# Patient Record
Sex: FEMALE | Race: WHITE | NOT HISPANIC OR LATINO | Employment: UNEMPLOYED | URBAN - METROPOLITAN AREA
[De-identification: names, ages, dates, MRNs, and addresses within clinical notes are randomized per-mention and may not be internally consistent; named-entity substitution may affect disease eponyms.]

---

## 2024-03-26 ENCOUNTER — HOSPITAL ENCOUNTER (OUTPATIENT)
Dept: OTHER | Facility: HOSPITAL | Age: 54
Discharge: HOME | End: 2024-03-26

## 2024-06-12 ENCOUNTER — OFFICE VISIT (OUTPATIENT)
Dept: PULMONOLOGY | Facility: CLINIC | Age: 54
End: 2024-06-12
Payer: COMMERCIAL

## 2024-06-12 VITALS
WEIGHT: 149 LBS | TEMPERATURE: 97.6 F | SYSTOLIC BLOOD PRESSURE: 110 MMHG | HEART RATE: 86 BPM | DIASTOLIC BLOOD PRESSURE: 64 MMHG | BODY MASS INDEX: 29.25 KG/M2 | OXYGEN SATURATION: 97 % | HEIGHT: 60 IN | RESPIRATION RATE: 18 BRPM

## 2024-06-12 DIAGNOSIS — R63.4 WEIGHT LOSS: ICD-10-CM

## 2024-06-12 DIAGNOSIS — Z72.0 TOBACCO USE: Primary | ICD-10-CM

## 2024-06-12 DIAGNOSIS — R04.2 HEMOPTYSIS: ICD-10-CM

## 2024-06-12 DIAGNOSIS — R91.1 LUNG NODULE: ICD-10-CM

## 2024-06-12 PROCEDURE — 99204 OFFICE O/P NEW MOD 45 MIN: CPT | Performed by: STUDENT IN AN ORGANIZED HEALTH CARE EDUCATION/TRAINING PROGRAM

## 2024-06-12 PROCEDURE — 1159F MED LIST DOCD IN RCRD: CPT | Performed by: STUDENT IN AN ORGANIZED HEALTH CARE EDUCATION/TRAINING PROGRAM

## 2024-06-12 PROCEDURE — 1160F RVW MEDS BY RX/DR IN RCRD: CPT | Performed by: STUDENT IN AN ORGANIZED HEALTH CARE EDUCATION/TRAINING PROGRAM

## 2024-06-12 RX ORDER — ESOMEPRAZOLE MAGNESIUM 40 MG/1
CAPSULE, DELAYED RELEASE ORAL
COMMUNITY
Start: 2024-05-18

## 2024-06-12 RX ORDER — ALBUTEROL SULFATE 90 UG/1
2 INHALANT RESPIRATORY (INHALATION)
COMMUNITY
Start: 2024-02-21

## 2024-06-12 RX ORDER — CILOSTAZOL 50 MG/1
TABLET ORAL
COMMUNITY
Start: 2024-03-21 | End: 2024-06-14

## 2024-06-12 RX ORDER — LINACLOTIDE 72 UG/1
72 CAPSULE, GELATIN COATED ORAL DAILY
COMMUNITY
Start: 2024-02-21

## 2024-06-12 RX ORDER — ESTRADIOL 0.1 MG/G
1 CREAM VAGINAL
COMMUNITY
Start: 2024-03-22 | End: 2024-06-14

## 2024-06-12 RX ORDER — NICOTINE 21-14-7MG
KIT TRANSDERMAL
COMMUNITY
Start: 2024-04-02 | End: 2024-06-14

## 2024-06-12 RX ORDER — SEMAGLUTIDE 0.68 MG/ML
0.5 INJECTION, SOLUTION SUBCUTANEOUS
COMMUNITY
Start: 2024-04-02

## 2024-06-12 RX ORDER — ASPIRIN 81 MG/1
81 TABLET ORAL DAILY
COMMUNITY
Start: 2024-02-21

## 2024-06-12 NOTE — H&P (VIEW-ONLY)
Primary Care Provider  Crista Payton APRN   Referring Provider  FLACO Berkowitz      Patient Complaint  Establish Care (1 CM RUL)      Subjective          Alyssa Hardy presents to [facility] PULMONARY & CRITICAL CARE MEDICINE      History of Presenting Illness  Alyssa Hardy is a 53 y.o. female here as a new patient for lung nodule.    Patient had a chest CT done 3/26/2024 which showed a 1 cm spiculated noncalcified nodule in the right upper lobe.    Patient has been a chronic smoker since age 12.  She is currently smoking 0.5 pack/day.  She reports family history of lung cancer, and her father.  She also has a chronic cough with sputum production.  Sometimes there is blood in it.  She also verbalized on weight loss up to 15 pounds in the last 1 to 2 months.  She denies night sweats and lymphadenopathy.  We discussed her CT findings and she expressed understanding.  No other acute issues at this time. I have personally reviewed patient's past family, social, medical and surgical histories and agree with their findings.      Review of Systems  Constitutional:  No fever. No chills. No weakness.  Eyes: No pain, erythema, or discharge. No blurring of vision.  ENT:  No sore throat, URI symptoms.   Cardiovascular:  No chest pain. No palpitations. No lower extremity edema.  Respiratory:  No shortness of breath, cough, pleuritic chest pain. No hemoptysis. No dyspnea.   GI:  Normal appetite. No nausea, vomiting, diarrhea. No pain. No melena.  Musculoskeletal:  No arthralgias or myalgias.  Neurologic:  No headache. No weakness.    History reviewed. No pertinent family history.     Social History     Socioeconomic History    Marital status: Unknown   Tobacco Use    Smoking status: Every Day     Current packs/day: 0.50     Types: Cigarettes   Vaping Use    Vaping status: Never Used   Substance and Sexual Activity    Alcohol use: Never    Drug use: Never    Sexual activity: Defer        History reviewed.  "No pertinent past medical history.     Immunization History   Administered Date(s) Administered    COVID-19 (PFIZER) Purple Cap Monovalent 10/19/2021, 11/09/2021       Allergies   Allergen Reactions    Hydrocodone-Acetaminophen Palpitations    Latex Swelling    Other Swelling     PLASTIC ALL TYPES OF PLASTIC    Cilostazol Other (See Comments)     tachycardia    Ibuprofen Other (See Comments)     Liver problems    Iodinated Contrast Media Unknown - Low Severity    Sulfamethoxazole-Trimethoprim Other (See Comments)     Dizziness, shortness of breath, chest pain    Codeine Palpitations    Penicillins Palpitations          Current Outpatient Medications:     aspirin 81 MG EC tablet, Take 1 tablet by mouth Daily., Disp: , Rfl:     cilostazol (PLETAL) 50 MG tablet, , Disp: , Rfl:     esomeprazole (nexIUM) 40 MG capsule, , Disp: , Rfl:     estradiol (ESTRACE) 0.1 MG/GM vaginal cream, Insert 1 g into the vagina., Disp: , Rfl:     Linzess 72 MCG capsule capsule, Take 1 capsule by mouth Daily., Disp: , Rfl:     Nicotine 21-14-7 MG/24HR kit, Take per dose pack instructions., Disp: , Rfl:     Ozempic, 0.25 or 0.5 MG/DOSE, 2 MG/3ML solution pen-injector, Inject 0.5 mg under the skin into the appropriate area as directed., Disp: , Rfl:     ProAir Digihaler 108 (90 Base) MCG/ACT aerosol powder , Inhale 2 puffs., Disp: , Rfl:      Objective     Vital Signs:   /64 (BP Location: Right arm, Patient Position: Sitting, Cuff Size: Adult)   Pulse 86   Temp 97.6 °F (36.4 °C) (Temporal)   Resp 18   Ht 152.4 cm (60\")   Wt 67.6 kg (149 lb) Comment: patient reported  SpO2 97% Comment: room air  BMI 29.10 kg/m²     Physical Exam  Vitals:    06/12/24 1322   BP: 110/64   Pulse: 86   Resp: 18   Temp: 97.6 °F (36.4 °C)   SpO2: 97%       General: Alert, NAD  HEENT:  EOMI, no sinus tenderness  Neck:  Supple, no thyromegaly,  no JVD  Lymph: no cervical, supraclavicular lymphadenopathy bilaterally  Chest:  clear to auscultation " bilaterally, no wheezing or crackles; no work of breathing noted  CV: RRR, no M/G/R, pulses 2+, equal.  Abd:  Soft, NT, ND, +BS  EXT:  no clubbing, no cyanosis, no edema b/l  Neuro:  A&Ox3, CN grossly intact, no focal deficits  Skin: No rashes or lesions noted       Result Review :   I have personally reviewed patient's labs and images.          Assessment and Plan      There is no problem list on file for this patient.      Impression and Plan:    Right upper lobe 1 cm lung nodule  Active smoker, 0.5 pack/day    CT done 3/26/2024 which showed a 1 cm spiculated noncalcified nodule in the right upper lobe.  Patient is at high risk given her family history of lung cancer, hemoptysis, active weight loss, active tobacco use  Will plan for Ion navigational bronchoscopy.  Date TBD.  She will need repeat CT in Ion format.  She is not on any blood thinners at this time.    Vaccination status: Patient is up-to-date with her COVID vaccination.    Medications personally reviewed.    Follow Up   No follow-ups on file.  Patient was given instructions and counseling regarding her condition or for health maintenance advice. Please see specific information pulled into the AVS if appropriate.

## 2024-06-12 NOTE — PROGRESS NOTES
Primary Care Provider  Crista Payton APRN   Referring Provider  FLACO Berkowitz      Patient Complaint  Establish Care (1 CM RUL)      Subjective          Alyssa Hardy presents to [facility] PULMONARY & CRITICAL CARE MEDICINE      History of Presenting Illness  Alyssa Hardy is a 53 y.o. female here as a new patient for lung nodule.    Patient had a chest CT done 3/26/2024 which showed a 1 cm spiculated noncalcified nodule in the right upper lobe.    Patient has been a chronic smoker since age 12.  She is currently smoking 0.5 pack/day.  She reports family history of lung cancer, and her father.  She also has a chronic cough with sputum production.  Sometimes there is blood in it.  She also verbalized on weight loss up to 15 pounds in the last 1 to 2 months.  She denies night sweats and lymphadenopathy.  We discussed her CT findings and she expressed understanding.  No other acute issues at this time. I have personally reviewed patient's past family, social, medical and surgical histories and agree with their findings.      Review of Systems  Constitutional:  No fever. No chills. No weakness.  Eyes: No pain, erythema, or discharge. No blurring of vision.  ENT:  No sore throat, URI symptoms.   Cardiovascular:  No chest pain. No palpitations. No lower extremity edema.  Respiratory:  No shortness of breath, cough, pleuritic chest pain. No hemoptysis. No dyspnea.   GI:  Normal appetite. No nausea, vomiting, diarrhea. No pain. No melena.  Musculoskeletal:  No arthralgias or myalgias.  Neurologic:  No headache. No weakness.    History reviewed. No pertinent family history.     Social History     Socioeconomic History    Marital status: Unknown   Tobacco Use    Smoking status: Every Day     Current packs/day: 0.50     Types: Cigarettes   Vaping Use    Vaping status: Never Used   Substance and Sexual Activity    Alcohol use: Never    Drug use: Never    Sexual activity: Defer        History reviewed.  "No pertinent past medical history.     Immunization History   Administered Date(s) Administered    COVID-19 (PFIZER) Purple Cap Monovalent 10/19/2021, 11/09/2021       Allergies   Allergen Reactions    Hydrocodone-Acetaminophen Palpitations    Latex Swelling    Other Swelling     PLASTIC ALL TYPES OF PLASTIC    Cilostazol Other (See Comments)     tachycardia    Ibuprofen Other (See Comments)     Liver problems    Iodinated Contrast Media Unknown - Low Severity    Sulfamethoxazole-Trimethoprim Other (See Comments)     Dizziness, shortness of breath, chest pain    Codeine Palpitations    Penicillins Palpitations          Current Outpatient Medications:     aspirin 81 MG EC tablet, Take 1 tablet by mouth Daily., Disp: , Rfl:     cilostazol (PLETAL) 50 MG tablet, , Disp: , Rfl:     esomeprazole (nexIUM) 40 MG capsule, , Disp: , Rfl:     estradiol (ESTRACE) 0.1 MG/GM vaginal cream, Insert 1 g into the vagina., Disp: , Rfl:     Linzess 72 MCG capsule capsule, Take 1 capsule by mouth Daily., Disp: , Rfl:     Nicotine 21-14-7 MG/24HR kit, Take per dose pack instructions., Disp: , Rfl:     Ozempic, 0.25 or 0.5 MG/DOSE, 2 MG/3ML solution pen-injector, Inject 0.5 mg under the skin into the appropriate area as directed., Disp: , Rfl:     ProAir Digihaler 108 (90 Base) MCG/ACT aerosol powder , Inhale 2 puffs., Disp: , Rfl:      Objective     Vital Signs:   /64 (BP Location: Right arm, Patient Position: Sitting, Cuff Size: Adult)   Pulse 86   Temp 97.6 °F (36.4 °C) (Temporal)   Resp 18   Ht 152.4 cm (60\")   Wt 67.6 kg (149 lb) Comment: patient reported  SpO2 97% Comment: room air  BMI 29.10 kg/m²     Physical Exam  Vitals:    06/12/24 1322   BP: 110/64   Pulse: 86   Resp: 18   Temp: 97.6 °F (36.4 °C)   SpO2: 97%       General: Alert, NAD  HEENT:  EOMI, no sinus tenderness  Neck:  Supple, no thyromegaly,  no JVD  Lymph: no cervical, supraclavicular lymphadenopathy bilaterally  Chest:  clear to auscultation " bilaterally, no wheezing or crackles; no work of breathing noted  CV: RRR, no M/G/R, pulses 2+, equal.  Abd:  Soft, NT, ND, +BS  EXT:  no clubbing, no cyanosis, no edema b/l  Neuro:  A&Ox3, CN grossly intact, no focal deficits  Skin: No rashes or lesions noted       Result Review :   I have personally reviewed patient's labs and images.          Assessment and Plan      There is no problem list on file for this patient.      Impression and Plan:    Right upper lobe 1 cm lung nodule  Active smoker, 0.5 pack/day    CT done 3/26/2024 which showed a 1 cm spiculated noncalcified nodule in the right upper lobe.  Patient is at high risk given her family history of lung cancer, hemoptysis, active weight loss, active tobacco use  Will plan for Ion navigational bronchoscopy.  Date TBD.  She will need repeat CT in Ion format.  She is not on any blood thinners at this time.    Vaccination status: Patient is up-to-date with her COVID vaccination.    Medications personally reviewed.    Follow Up   No follow-ups on file.  Patient was given instructions and counseling regarding her condition or for health maintenance advice. Please see specific information pulled into the AVS if appropriate.

## 2024-06-14 ENCOUNTER — TELEPHONE (OUTPATIENT)
Dept: PULMONOLOGY | Facility: CLINIC | Age: 54
End: 2024-06-14
Payer: COMMERCIAL

## 2024-06-14 RX ORDER — ERGOCALCIFEROL 1.25 MG/1
50000 CAPSULE ORAL WEEKLY
COMMUNITY

## 2024-06-14 RX ORDER — NADOLOL 20 MG/1
20 TABLET ORAL DAILY
COMMUNITY

## 2024-06-14 NOTE — TELEPHONE ENCOUNTER
Received communication from DEE Harp with PAT.  Patient advised RN she does not wish for the person providing her transportation to be made aware of her bronchoscopy outcome or results.

## 2024-06-14 NOTE — PRE-PROCEDURE INSTRUCTIONS
IMPORTANT INSTRUCTIONS - PRE-ADMISSION TESTING  DO NOT EAT/DRINK  OR CHEW anything after midnight the night before your procedure.      Take the following medications the morning of your procedure with JUST A SIP OF WATER:  NADOLOL, NEXIUM _______________________________________________________________________________________________________________________________________________________________________________________    DO NOT BRING your medications to the hospital with you, UNLESS something has changed since your PRE-Admission Testing appointment.  Hold all vitamins, supplements, and NSAIDS (Non- steroidal anti-inflammatory meds) for one week prior to surgery (you MAY take Tylenol or Acetaminophen).  If you are diabetic, check your blood sugar the morning of your procedure. If it is less than 70 or if you are feeling symptomatic, call the following number for further instructions: [phone]  Use your inhalers/nebulizers as usual, the morning of your procedure. BRING YOUR INHALERS with you.   Bring your CPAP or BIPAP to hospital, ONLY IF YOU WILL BE SPENDING THE NIGHT.   Make sure you have a ride home and have someone who will stay with you the day of your procedure after you go home.  If you have any questions, please call your Pre-Admission Testing Nurse, CAIO_____ at [phone]____.   Per anesthesia request, do not smoke for 24 hours before your procedure or as instructed by your surgeon.

## 2024-06-17 ENCOUNTER — ANESTHESIA EVENT (OUTPATIENT)
Dept: PERIOP | Facility: HOSPITAL | Age: 54
End: 2024-06-17
Payer: COMMERCIAL

## 2024-06-18 ENCOUNTER — HOSPITAL ENCOUNTER (OUTPATIENT)
Dept: CT IMAGING | Facility: HOSPITAL | Age: 54
Discharge: HOME | End: 2024-06-18
Admitting: STUDENT IN AN ORGANIZED HEALTH CARE EDUCATION/TRAINING PROGRAM
Payer: COMMERCIAL

## 2024-06-18 DIAGNOSIS — Z72.0 TOBACCO USE: ICD-10-CM

## 2024-06-18 DIAGNOSIS — R91.1 LUNG NODULE: ICD-10-CM

## 2024-06-18 PROCEDURE — 71250 CT THORAX DX C-: CPT

## 2024-06-19 ENCOUNTER — ANESTHESIA (OUTPATIENT)
Dept: PERIOP | Facility: HOSPITAL | Age: 54
End: 2024-06-19
Payer: COMMERCIAL

## 2024-06-19 ENCOUNTER — HOSPITAL ENCOUNTER (OUTPATIENT)
Facility: HOSPITAL | Age: 54
Setting detail: HOSPITAL OUTPATIENT SURGERY
Discharge: HOME | End: 2024-06-19
Attending: STUDENT IN AN ORGANIZED HEALTH CARE EDUCATION/TRAINING PROGRAM | Admitting: STUDENT IN AN ORGANIZED HEALTH CARE EDUCATION/TRAINING PROGRAM
Payer: COMMERCIAL

## 2024-06-19 ENCOUNTER — APPOINTMENT (OUTPATIENT)
Dept: GENERAL RADIOLOGY | Facility: HOSPITAL | Age: 54
End: 2024-06-19
Payer: COMMERCIAL

## 2024-06-19 VITALS
BODY MASS INDEX: 28.35 KG/M2 | HEART RATE: 98 BPM | HEIGHT: 60 IN | OXYGEN SATURATION: 96 % | SYSTOLIC BLOOD PRESSURE: 117 MMHG | DIASTOLIC BLOOD PRESSURE: 68 MMHG | WEIGHT: 144.4 LBS | TEMPERATURE: 98 F | RESPIRATION RATE: 16 BRPM

## 2024-06-19 DIAGNOSIS — R91.1 LUNG NODULE: ICD-10-CM

## 2024-06-19 LAB
ACB CMPLX DNA BAL NAA+NON-PRB-NCNCRNG: NOT DETECTED
BLACTX-M ISLT/SPM QL: NORMAL
BLAIMP ISLT/SPM QL: NORMAL
BLAKPC ISLT/SPM QL: NORMAL
BLAOXA-48-LIKE ISLT/SPM QL: NORMAL
BLAVIM ISLT/SPM QL: NORMAL
C PNEUM DNA NPH QL NAA+NON-PROBE: NOT DETECTED
CILIATED BAL QL: 21 %
E CLOAC COMP DNA BAL NAA+NON-PRB-NCNCRNG: NOT DETECTED
E COLI DNA BAL NAA+NON-PRB-NCNCRNG: NOT DETECTED
FLUAV SUBTYP SPEC NAA+PROBE: NOT DETECTED
FLUBV RNA ISLT QL NAA+PROBE: NOT DETECTED
GLUCOSE BLDC GLUCOMTR-MCNC: 301 MG/DL (ref 70–99)
GLUCOSE BLDC GLUCOMTR-MCNC: 336 MG/DL (ref 70–99)
GLUCOSE BLDC GLUCOMTR-MCNC: 339 MG/DL (ref 70–99)
GLUCOSE BLDC GLUCOMTR-MCNC: 348 MG/DL (ref 70–99)
GLUCOSE BLDC GLUCOMTR-MCNC: 404 MG/DL (ref 70–99)
GP B STREP DNA BAL NAA+NON-PRB-NCNCRNG: NOT DETECTED
HADV DNA SPEC NAA+PROBE: NOT DETECTED
HAEM INFLU DNA BAL NAA+NON-PRB-NCNCRNG: NOT DETECTED
HCOV RNA LOWER RESP QL NAA+NON-PROBE: NOT DETECTED
HMPV RNA NPH QL NAA+NON-PROBE: NOT DETECTED
HPIV RNA LOWER RESP QL NAA+NON-PROBE: NOT DETECTED
K AEROGENES DNA BAL NAA+NON-PRB-NCNCRNG: NOT DETECTED
K OXYTOCA DNA BAL NAA+NON-PRB-NCNCRNG: NOT DETECTED
K PNEU GRP DNA BAL NAA+NON-PRB-NCNCRNG: NOT DETECTED
L PNEUMO DNA LOWER RESP QL NAA+NON-PROBE: NOT DETECTED
LYMPHOCYTES NFR FLD MANUAL: 26 %
M CATARRHALIS DNA BAL NAA+NON-PRB-NCNCRNG: NOT DETECTED
M PNEUMO IGG SER IA-ACNC: NOT DETECTED
MACROPHAGE FLUID: 37 %
MECA+MECC ISLT/SPM QL: NORMAL
NDM GENE: NORMAL
NEUTROPHILS NFR FLD MANUAL: 16 %
P AERUGINOSA DNA BAL NAA+NON-PRB-NCNCRNG: NOT DETECTED
PROTEUS SP DNA BAL NAA+NON-PRB-NCNCRNG: NOT DETECTED
RHINOVIRUS RNA SPEC NAA+PROBE: NOT DETECTED
RSV RNA NPH QL NAA+NON-PROBE: NOT DETECTED
S AUREUS DNA BAL NAA+NON-PRB-NCNCRNG: NOT DETECTED
S MARCESCENS DNA BAL NAA+NON-PRB-NCNCRNG: NOT DETECTED
S PNEUM DNA BAL NAA+NON-PRB-NCNCRNG: NOT DETECTED
S PYO DNA BAL NAA+NON-PRB-NCNCRNG: NOT DETECTED
VISUAL PRESENCE OF BLOOD: NORMAL

## 2024-06-19 PROCEDURE — 76000 FLUOROSCOPY <1 HR PHYS/QHP: CPT

## 2024-06-19 PROCEDURE — 25010000002 SUGAMMADEX 200 MG/2ML SOLUTION: Performed by: NURSE ANESTHETIST, CERTIFIED REGISTERED

## 2024-06-19 PROCEDURE — 82948 REAGENT STRIP/BLOOD GLUCOSE: CPT

## 2024-06-19 PROCEDURE — 87071 CULTURE AEROBIC QUANT OTHER: CPT | Performed by: STUDENT IN AN ORGANIZED HEALTH CARE EDUCATION/TRAINING PROGRAM

## 2024-06-19 PROCEDURE — 31624 DX BRONCHOSCOPE/LAVAGE: CPT | Performed by: STUDENT IN AN ORGANIZED HEALTH CARE EDUCATION/TRAINING PROGRAM

## 2024-06-19 PROCEDURE — 31629 BRONCHOSCOPY/NEEDLE BX EACH: CPT | Performed by: STUDENT IN AN ORGANIZED HEALTH CARE EDUCATION/TRAINING PROGRAM

## 2024-06-19 PROCEDURE — 31627 NAVIGATIONAL BRONCHOSCOPY: CPT | Performed by: STUDENT IN AN ORGANIZED HEALTH CARE EDUCATION/TRAINING PROGRAM

## 2024-06-19 PROCEDURE — 87205 SMEAR GRAM STAIN: CPT | Performed by: STUDENT IN AN ORGANIZED HEALTH CARE EDUCATION/TRAINING PROGRAM

## 2024-06-19 PROCEDURE — 31645 BRNCHSC W/THER ASPIR 1ST: CPT | Performed by: STUDENT IN AN ORGANIZED HEALTH CARE EDUCATION/TRAINING PROGRAM

## 2024-06-19 PROCEDURE — 25010000002 PROPOFOL 500 MG/50ML EMULSION: Performed by: NURSE ANESTHETIST, CERTIFIED REGISTERED

## 2024-06-19 PROCEDURE — 25010000002 DEXAMETHASONE PER 1 MG: Performed by: NURSE ANESTHETIST, CERTIFIED REGISTERED

## 2024-06-19 PROCEDURE — 87633 RESP VIRUS 12-25 TARGETS: CPT | Performed by: STUDENT IN AN ORGANIZED HEALTH CARE EDUCATION/TRAINING PROGRAM

## 2024-06-19 PROCEDURE — 31652 BRONCH EBUS SAMPLNG 1/2 NODE: CPT | Performed by: STUDENT IN AN ORGANIZED HEALTH CARE EDUCATION/TRAINING PROGRAM

## 2024-06-19 PROCEDURE — 88173 CYTOPATH EVAL FNA REPORT: CPT | Performed by: STUDENT IN AN ORGANIZED HEALTH CARE EDUCATION/TRAINING PROGRAM

## 2024-06-19 PROCEDURE — 25010000002 MIDAZOLAM PER 1MG: Performed by: ANESTHESIOLOGY

## 2024-06-19 PROCEDURE — 63710000001 INSULIN REGULAR HUMAN PER 5 UNITS: Performed by: ANESTHESIOLOGY

## 2024-06-19 PROCEDURE — 88108 CYTOPATH CONCENTRATE TECH: CPT | Performed by: STUDENT IN AN ORGANIZED HEALTH CARE EDUCATION/TRAINING PROGRAM

## 2024-06-19 PROCEDURE — 88305 TISSUE EXAM BY PATHOLOGIST: CPT | Performed by: STUDENT IN AN ORGANIZED HEALTH CARE EDUCATION/TRAINING PROGRAM

## 2024-06-19 PROCEDURE — 89051 BODY FLUID CELL COUNT: CPT | Performed by: STUDENT IN AN ORGANIZED HEALTH CARE EDUCATION/TRAINING PROGRAM

## 2024-06-19 PROCEDURE — 82948 REAGENT STRIP/BLOOD GLUCOSE: CPT | Performed by: NURSE ANESTHETIST, CERTIFIED REGISTERED

## 2024-06-19 PROCEDURE — 87070 CULTURE OTHR SPECIMN AEROBIC: CPT | Performed by: STUDENT IN AN ORGANIZED HEALTH CARE EDUCATION/TRAINING PROGRAM

## 2024-06-19 PROCEDURE — 25810000003 LACTATED RINGERS PER 1000 ML: Performed by: ANESTHESIOLOGY

## 2024-06-19 PROCEDURE — 25010000002 ONDANSETRON PER 1 MG: Performed by: NURSE ANESTHETIST, CERTIFIED REGISTERED

## 2024-06-19 PROCEDURE — 25010000002 FENTANYL CITRATE (PF) 50 MCG/ML SOLUTION: Performed by: NURSE ANESTHETIST, CERTIFIED REGISTERED

## 2024-06-19 PROCEDURE — 87102 FUNGUS ISOLATION CULTURE: CPT | Performed by: STUDENT IN AN ORGANIZED HEALTH CARE EDUCATION/TRAINING PROGRAM

## 2024-06-19 PROCEDURE — 87206 SMEAR FLUORESCENT/ACID STAI: CPT | Performed by: STUDENT IN AN ORGANIZED HEALTH CARE EDUCATION/TRAINING PROGRAM

## 2024-06-19 PROCEDURE — 87116 MYCOBACTERIA CULTURE: CPT | Performed by: STUDENT IN AN ORGANIZED HEALTH CARE EDUCATION/TRAINING PROGRAM

## 2024-06-19 PROCEDURE — 31654 BRONCH EBUS IVNTJ PERPH LES: CPT | Performed by: STUDENT IN AN ORGANIZED HEALTH CARE EDUCATION/TRAINING PROGRAM

## 2024-06-19 PROCEDURE — 25010000002 METOCLOPRAMIDE PER 10 MG: Performed by: ANESTHESIOLOGY

## 2024-06-19 PROCEDURE — 31628 BRONCHOSCOPY/LUNG BX EACH: CPT | Performed by: STUDENT IN AN ORGANIZED HEALTH CARE EDUCATION/TRAINING PROGRAM

## 2024-06-19 RX ORDER — ONDANSETRON 2 MG/ML
4 INJECTION INTRAMUSCULAR; INTRAVENOUS ONCE AS NEEDED
Status: DISCONTINUED | OUTPATIENT
Start: 2024-06-19 | End: 2024-06-19 | Stop reason: HOSPADM

## 2024-06-19 RX ORDER — FAMOTIDINE 10 MG/ML
20 INJECTION, SOLUTION INTRAVENOUS
Status: COMPLETED | OUTPATIENT
Start: 2024-06-19 | End: 2024-06-19

## 2024-06-19 RX ORDER — ACETAMINOPHEN 500 MG
1000 TABLET ORAL ONCE
Status: COMPLETED | OUTPATIENT
Start: 2024-06-19 | End: 2024-06-19

## 2024-06-19 RX ORDER — PROMETHAZINE HYDROCHLORIDE 25 MG/1
25 SUPPOSITORY RECTAL ONCE AS NEEDED
Status: DISCONTINUED | OUTPATIENT
Start: 2024-06-19 | End: 2024-06-19 | Stop reason: HOSPADM

## 2024-06-19 RX ORDER — METOCLOPRAMIDE HYDROCHLORIDE 5 MG/ML
10 INJECTION INTRAMUSCULAR; INTRAVENOUS ONCE
Status: COMPLETED | OUTPATIENT
Start: 2024-06-19 | End: 2024-06-19

## 2024-06-19 RX ORDER — DEXAMETHASONE SODIUM PHOSPHATE 4 MG/ML
INJECTION, SOLUTION INTRA-ARTICULAR; INTRALESIONAL; INTRAMUSCULAR; INTRAVENOUS; SOFT TISSUE AS NEEDED
Status: DISCONTINUED | OUTPATIENT
Start: 2024-06-19 | End: 2024-06-19 | Stop reason: SURG

## 2024-06-19 RX ORDER — OXYCODONE HYDROCHLORIDE 5 MG/1
5 TABLET ORAL
Status: DISCONTINUED | OUTPATIENT
Start: 2024-06-19 | End: 2024-06-19 | Stop reason: HOSPADM

## 2024-06-19 RX ORDER — MIDAZOLAM HYDROCHLORIDE 2 MG/2ML
2 INJECTION, SOLUTION INTRAMUSCULAR; INTRAVENOUS ONCE
Status: COMPLETED | OUTPATIENT
Start: 2024-06-19 | End: 2024-06-19

## 2024-06-19 RX ORDER — ROCURONIUM BROMIDE 10 MG/ML
INJECTION, SOLUTION INTRAVENOUS AS NEEDED
Status: DISCONTINUED | OUTPATIENT
Start: 2024-06-19 | End: 2024-06-19 | Stop reason: SURG

## 2024-06-19 RX ORDER — PROPOFOL 10 MG/ML
INJECTION, EMULSION INTRAVENOUS AS NEEDED
Status: DISCONTINUED | OUTPATIENT
Start: 2024-06-19 | End: 2024-06-19 | Stop reason: SURG

## 2024-06-19 RX ORDER — PROMETHAZINE HYDROCHLORIDE 12.5 MG/1
25 TABLET ORAL ONCE AS NEEDED
Status: DISCONTINUED | OUTPATIENT
Start: 2024-06-19 | End: 2024-06-19 | Stop reason: HOSPADM

## 2024-06-19 RX ORDER — LIDOCAINE HYDROCHLORIDE 20 MG/ML
INJECTION, SOLUTION EPIDURAL; INFILTRATION; INTRACAUDAL; PERINEURAL AS NEEDED
Status: DISCONTINUED | OUTPATIENT
Start: 2024-06-19 | End: 2024-06-19 | Stop reason: SURG

## 2024-06-19 RX ORDER — PHENYLEPHRINE HCL IN 0.9% NACL 1 MG/10 ML
SYRINGE (ML) INTRAVENOUS AS NEEDED
Status: DISCONTINUED | OUTPATIENT
Start: 2024-06-19 | End: 2024-06-19 | Stop reason: SURG

## 2024-06-19 RX ORDER — FENTANYL CITRATE 50 UG/ML
INJECTION, SOLUTION INTRAMUSCULAR; INTRAVENOUS AS NEEDED
Status: DISCONTINUED | OUTPATIENT
Start: 2024-06-19 | End: 2024-06-19 | Stop reason: SURG

## 2024-06-19 RX ORDER — ONDANSETRON 2 MG/ML
INJECTION INTRAMUSCULAR; INTRAVENOUS AS NEEDED
Status: DISCONTINUED | OUTPATIENT
Start: 2024-06-19 | End: 2024-06-19 | Stop reason: SURG

## 2024-06-19 RX ORDER — SODIUM CHLORIDE, SODIUM LACTATE, POTASSIUM CHLORIDE, CALCIUM CHLORIDE 600; 310; 30; 20 MG/100ML; MG/100ML; MG/100ML; MG/100ML
9 INJECTION, SOLUTION INTRAVENOUS CONTINUOUS PRN
Status: DISCONTINUED | OUTPATIENT
Start: 2024-06-19 | End: 2024-06-19 | Stop reason: HOSPADM

## 2024-06-19 RX ADMIN — PROPOFOL 150 MG: 10 INJECTION, EMULSION INTRAVENOUS at 07:51

## 2024-06-19 RX ADMIN — INSULIN HUMAN 8 UNITS: 100 INJECTION, SOLUTION PARENTERAL at 06:53

## 2024-06-19 RX ADMIN — FAMOTIDINE 20 MG: 10 INJECTION INTRAVENOUS at 06:53

## 2024-06-19 RX ADMIN — ROCURONIUM BROMIDE 50 MG: 10 INJECTION, SOLUTION INTRAVENOUS at 07:54

## 2024-06-19 RX ADMIN — SUGAMMADEX 200 MG: 100 INJECTION, SOLUTION INTRAVENOUS at 09:10

## 2024-06-19 RX ADMIN — DEXAMETHASONE SODIUM PHOSPHATE 4 MG: 4 INJECTION, SOLUTION INTRAMUSCULAR; INTRAVENOUS at 08:08

## 2024-06-19 RX ADMIN — Medication 100 MCG: at 08:30

## 2024-06-19 RX ADMIN — ONDANSETRON 4 MG: 2 INJECTION INTRAMUSCULAR; INTRAVENOUS at 08:53

## 2024-06-19 RX ADMIN — INSULIN HUMAN 5 UNITS: 100 INJECTION, SOLUTION PARENTERAL at 10:07

## 2024-06-19 RX ADMIN — FENTANYL CITRATE 100 MCG: 50 INJECTION, SOLUTION INTRAMUSCULAR; INTRAVENOUS at 07:51

## 2024-06-19 RX ADMIN — MIDAZOLAM HYDROCHLORIDE 2 MG: 1 INJECTION, SOLUTION INTRAMUSCULAR; INTRAVENOUS at 07:31

## 2024-06-19 RX ADMIN — METOCLOPRAMIDE HYDROCHLORIDE 10 MG: 5 INJECTION INTRAMUSCULAR; INTRAVENOUS at 07:31

## 2024-06-19 RX ADMIN — ROCURONIUM BROMIDE 10 MG: 10 INJECTION, SOLUTION INTRAVENOUS at 08:48

## 2024-06-19 RX ADMIN — SODIUM CHLORIDE, SODIUM LACTATE, POTASSIUM CHLORIDE, AND CALCIUM CHLORIDE 9 ML/HR: .6; .31; .03; .02 INJECTION, SOLUTION INTRAVENOUS at 06:57

## 2024-06-19 RX ADMIN — ACETAMINOPHEN 1000 MG: 500 TABLET ORAL at 06:53

## 2024-06-19 RX ADMIN — PROPOFOL 200 MCG/KG/MIN: 10 INJECTION, EMULSION INTRAVENOUS at 07:54

## 2024-06-19 RX ADMIN — LIDOCAINE HYDROCHLORIDE 80 MG: 20 INJECTION, SOLUTION EPIDURAL; INFILTRATION; INTRACAUDAL; PERINEURAL at 07:51

## 2024-06-19 NOTE — ANESTHESIA POSTPROCEDURE EVALUATION
Patient: Alyssa Hardy    Procedure Summary       Date: 06/19/24 Room / Location: [facility] OR 04 / [facility] MAIN OR    Anesthesia Start: 0746 Anesthesia Stop: 0920    Procedure: BRONCHOSCOPY NAVIGATION WITH ENDOBRONCHIAL ULTRASOUND AND ION ROBOT, REBUS, EBUS, BAL, WASHINGS, NEEDLE ASPIRATES, BIOPSIES (Bronchus) Diagnosis:       Lung nodule      (Lung nodule [R91.1])    Surgeons: Ganga Syed MD Provider: Noemi Chavis MD    Anesthesia Type: general ASA Status: 3            Anesthesia Type: general    Vitals  Vitals Value Taken Time   /55 06/19/24 1030   Temp 36.1 °C (97 °F) 06/19/24 0918   Pulse 90 06/19/24 1030   Resp 16 06/19/24 1030   SpO2 92 % 06/19/24 1030           Post Anesthesia Care and Evaluation    Patient location during evaluation: bedside  Patient participation: complete - patient participated  Level of consciousness: awake  Pain management: adequate    Airway patency: patent  PONV Status: none  Cardiovascular status: acceptable and stable  Respiratory status: acceptable  Hydration status: acceptable

## 2024-06-19 NOTE — OP NOTE
Procedure:   Robotic navigational assisted bronchoscopy with radial probe endobronchial ultrasound  Transbronchial needle aspiration  Transbronchial lung biopsies, bronchoalveolar lavage, bronchial washings.    Bronchoscopy with linear endobronchial ultrasound/fine-needle aspiration.    Bronchoscopy with clearance of airways     Pre-Operative Diagnosis: Lung nodule     Post-Operative Diagnosis: Same, hilar adenopathy     Timeout performed     Anesthesia: General anesthesia     Procedure Details: The patient was consented for the procedure with all risk and benefit of the procedure explained in detail.  Patient was given the opportunity to ask questions and all concerns were answered. The bronchoscope was inserted into the main airway via the endotracheal tube. An anatomical survey was done of the main airways and the subsegmental bronchus.      Findings: First, fiberoptic bronchoscope airway inspection was performed.  Endotracheal tube was in adequate position in the mid thoracic trachea.  The trachea was normal in caliber and had no mucosal abnormalities. The left tracheobronchial tree appeared anatomically normal with no mucosal abnormalities. The right tracheobronchial tree appeared anatomically normal with no mucosal abnormalities.  All airways were evaluated and cleared.     Next the fiberoptic bronchoscope was removed and Ion robotic navigational bronchoscope was inserted into the endotracheal tube.  Using navigational guidance, we approach to the lung nodule in the RUL of lung.  Identification of RUL  lung nodule and appropriate positioning was confirmed via radial probe EBUS and fluoroscopy.  Under fluoroscopic guidance, transbronchial needle aspiration was performed at the lung nodule.  Radial probe EBUS was then inserted to again confirm appropriate position under fluoroscopic guidance.  Transbronchial lung biopsies were performed under fluoroscopic guidance at the lung nodule in the RUL of the lung.  Radial probe EBUS was then inserted to again confirm appropriate position under fluoroscopic guidance.  A bronchoalveolar lavage was performed using 15 mL aliquots of normal saline  instilled into RUL lung nodule then aspirated back. There was 10 mL blood-tinged fluid in return.  Bronchial washings were collected.     After Ion navigational bronchoscope was removed, linear endobronchial ultrasound was inserted through the endotracheal tube. Using ultrasound, the following lymph nodes were identified and found to be adequate for sampling: Lymph Nodes Subcarinal.  Using endobronchial ultrasound, fine-needle aspiration was performed in station 7 with 5 passes.    Findings:     Estimated Blood Loss: Less than 10 mL     Specimens:  Fine-needle aspiration of lung nodule RUL lobe of lung using radial probe ultrasound  Transbronchial lung biopsies lung nodule RUL lobe of lung using radial probe ultrasound  Fine-needle aspiration of lymph node stations 7 under ultrasound guidance  Bronchoalveolar lavage RUL  Bronchial washings      Complications: None; patient tolerated the procedure well.     Disposition: Stable to discharge home.  Follow-up test results.

## 2024-06-19 NOTE — DISCHARGE INSTRUCTIONS
DISCHARGE INSTRUCTIONS  BRONCHOSCOPY/  ENDOBRONCHIAL ULTRASOUND      For your procedure you had:  General Anesthesia (you may have a sore throat for the first 24 hours)    You may experience dizziness, drowsiness, or lightheadedness for several hours following surgery/procedure.  Do not stay alone today or tonight.  Limit your activity for 24 hours.  You should not drive or operate machinery or drink alcohol for 24 hours or while you are taking pain medication.  You should not sign legally binding documents.  Resume your diet slowly.  Follow any special dietary instructions you may have been given by your doctor.  NOTIFY YOUR DOCTOR IF YOU EXPERIENCE ANY OF THE FOLLOWING:  Temperature greater than 101 degrees Fahrenheit  Shaking Chills  Redness or excessive drainage from incision  Nausea, vomiting and/or pain that is not controlled by prescribed medications  Increase in bleeding or bleeding that is excessive  Unable to urinate in 6 hours after surgery  If unable to reach your doctor, please go to the closest Emergency Room     Following your bronchoscopy, you may experience a mild sore throat or cough up small amounts of blood.  Extremes of either should be reported to your doctor.  If you have sudden shortness of breath, sense of urgency, chest pain, chest pressure that worsens over time or sharp chest pains that worsen with deep breaths or coughs go the ED or call 911.  Smokers are encouraged not to smoke for 6 to 8 hours after the procedure to decrease the risk of coughing and bleeding.  Nausea and vomiting can occur, but is not a common side effect of the procedure you have had today. If you experience any nausea or vomiting, take clear liquids for your next meal.  Coughing (slight dry cough to hacking cough) is completely expected for 3 to 4 days.  Pink/ blood tinged sputum is expected for several days ( or while coughing)  Notify MD or go to the ED if significant blood is found in the sputum.  Missing your  appointment/follow-up could lead to serious complications.    LAST DOSE OF PAIN MEDICATION:  _____________________________________

## 2024-06-19 NOTE — ANESTHESIA PREPROCEDURE EVALUATION
Anesthesia Evaluation     Patient summary reviewed and Nursing notes reviewed   no history of anesthetic complications:   NPO Solid Status: > 8 hours  NPO Liquid Status: > 2 hours           Airway   Mallampati: II  TM distance: >3 FB  Neck ROM: full  No difficulty expected  Dental      Pulmonary - normal exam    breath sounds clear to auscultation  (+) ,shortness of breath  Cardiovascular - normal exam  Exercise tolerance: good (4-7 METS)    ECG reviewed  Rhythm: regular  Rate: normal    (+) hypertension, CAD, PVD      Neuro/Psych  (+) psychiatric history PTSD  GI/Hepatic/Renal/Endo    (+) renal disease-, diabetes mellitus type 2, thyroid problem hypothyroidism    Musculoskeletal (-) negative ROS    Abdominal    Substance History - negative use     OB/GYN negative ob/gyn ROS         Other - negative ROS       ROS/Med Hx Other: <4METS, SOAE.HX ? MI 2008/09?,SOA,SMOKERA, PSVT,PTSD (REQUESTS FEMALE PRESENCE IN ROOM IF MALES ARE PRESENT. STRESS 2019 LOW RISK STUDY. KT     Pt has ptsd, does not want valium, ok with versed, will need to place bis monitor, pt terrified after remembering a procedure during vascular procedure, prefers not to be touched by any male, last glp-1 was one week ago                    Anesthesia Plan    ASA 3     general     (Patient understands anesthesia not responsible for dental damage.    Blood sugar 404, will give insulin, pepcid, reglan)  intravenous induction     Anesthetic plan, risks, benefits, and alternatives have been provided, discussed and informed consent has been obtained with: patient.    Use of blood products discussed with patient .    Plan discussed with CRNA.        CODE STATUS:

## 2024-06-21 LAB
BACTERIA SPEC AEROBE CULT: NORMAL
BACTERIA SPEC RESP CULT: NORMAL
GRAM STN SPEC: NORMAL

## 2024-06-24 ENCOUNTER — TELEPHONE (OUTPATIENT)
Dept: PULMONOLOGY | Facility: CLINIC | Age: 54
End: 2024-06-24

## 2024-06-24 LAB
CYTO UR: NORMAL
CYTO UR: NORMAL
LAB AP CASE REPORT: NORMAL
LAB AP CASE REPORT: NORMAL
LAB AP CLINICAL INFORMATION: NORMAL
LAB AP CLINICAL INFORMATION: NORMAL
LAB AP DIAGNOSIS COMMENT: NORMAL
LAB AP DIAGNOSIS COMMENT: NORMAL
PATH REPORT.FINAL DX SPEC: NORMAL
PATH REPORT.FINAL DX SPEC: NORMAL
PATH REPORT.GROSS SPEC: NORMAL
PATH REPORT.GROSS SPEC: NORMAL

## 2024-06-24 NOTE — PROGRESS NOTES
Primary Care Provider  Crista Payton, APRN   Referring Provider  No ref. provider found      Patient Complaint  Lung Nodule, Cough, Shortness of Breath, and Follow-up (Pt here for follow up/ Ion bronch results)      Subjective          Alyssa Hardy presents to [facility]      History of Presenting Illness  Alyssa Hardy is a 53 y.o. female patient of Dr. Syed with lung nodule, hemoptysis, weight loss, and tobacco use ongoing, here for follow-up.    Patient states she is doing okay since her last visit, here today to go over results of Ion bronchoscopy.  Patient was initially seen as a new patient after chest CT 3/25/2024 showed a 1 cm right upper lobe spiculated nodule.  She also reported a 15 pound weight loss in the past 1-2 months and some blood in her sputum.  Patient underwent Ion bronchoscopy for biopsy 6/19/2024, which showed atypical cells, suspicious for malignancy.  Discussed with patient that we are going to approach this as if it is malignancy while we wait for expert consultation from [facility].  Patient denies using any antibiotics or steroids for her lungs recently, denies any fevers or chills, no ER visits or hospitalizations for her breathing since she was last seen.  She does not use any maintenance inhaler or nebulizers but does have an albuterol inhaler to use as needed.  She is a current smoker, 0.25 pack/day, 11 pack years. Patient denies any hemoptysis, swollen lymph nodes, weight loss, or night sweats.  Overall, patient is doing well and has no additional concerns at this time.  Patient is able to perform ADLs without difficulty.  I have personally reviewed the review of systems, past family, social, medical and surgical histories; and agree with their findings.      Review of Systems    Review of Systems   Constitutional:  Negative for activity change, chills, fatigue, fever, unexpected weight gain and unexpected weight loss.    HENT:  Negative for congestion, ear discharge, ear pain, mouth sores, postnasal drip, rhinorrhea, sinus pressure, sore throat, swollen glands and trouble swallowing.    Eyes:  Negative for blurred vision, pain, discharge, itching and visual disturbance.   Respiratory:  Positive for cough and shortness of breath. Negative for apnea, chest tightness, wheezing and stridor.    Cardiovascular:  Negative for chest pain, palpitations and leg swelling.   Gastrointestinal:  Negative for abdominal distention, abdominal pain, constipation, diarrhea, nausea, vomiting, GERD and indigestion.   Musculoskeletal:  Negative for arthralgias, joint swelling and myalgias.   Skin:  Negative for color change.   Neurological:  Negative for dizziness, weakness, light-headedness and headache.      Sleep: Negative for Excessive daytime sleepiness  Negative for morning headaches  Negative for Snoring      Family History   Problem Relation Age of Onset    Malig Hyperthermia Neg Hx         Social History     Socioeconomic History    Marital status: Unknown   Tobacco Use    Smoking status: Every Day     Current packs/day: 0.25     Average packs/day: 0.3 packs/day for 42.5 years (10.6 ttl pk-yrs)     Types: Cigarettes     Start date: 1982     Passive exposure: Never    Smokeless tobacco: Former    Tobacco comments:     LAST 6/18/24   Vaping Use    Vaping status: Never Used   Substance and Sexual Activity    Alcohol use: Never    Drug use: Never    Sexual activity: Defer        Past Medical History:   Diagnosis Date    Anesthesia     Pt WOKE UP ONCE    CAD (coronary artery disease)     Diabetes mellitus     [place] (hard of hearing)     Hypertension     IBS (irritable bowel syndrome)     Kidney atrophy     PTSD (post-traumatic stress disorder)     Pulmonary nodule     PVD (peripheral vascular disease) with claudication     Shortness of breath     Smoker     Tachycardia     Thyroid goiter         Immunization History   Administered Date(s)  "Administered    COVID-19 (PFIZER) Purple Cap Monovalent 10/19/2021, 11/09/2021       Allergies   Allergen Reactions    Hydrocodone-Acetaminophen Palpitations    Latex Swelling    Other Swelling     PLASTIC ALL TYPES OF PLASTIC    Cilostazol Other (See Comments)     tachycardia    Ibuprofen Other (See Comments)     Liver problems    Iodinated Contrast Media Unknown - Low Severity    Sulfamethoxazole-Trimethoprim Other (See Comments)     Dizziness, shortness of breath, chest pain    Codeine Palpitations    Penicillins Palpitations     CHEST PAIN           Current Outpatient Medications:     aspirin 81 MG EC tablet, Take 1 tablet by mouth Daily., Disp: , Rfl:     esomeprazole (nexIUM) 40 MG capsule, , Disp: , Rfl:     Linzess 72 MCG capsule capsule, Take 1 capsule by mouth Daily., Disp: , Rfl:     nadolol (CORGARD) 20 MG tablet, Take 1 tablet by mouth Daily., Disp: , Rfl:     Ozempic, 0.25 or 0.5 MG/DOSE, 2 MG/3ML solution pen-injector, Inject 0.5 mg under the skin into the appropriate area as directed., Disp: , Rfl:     ProAir Digihaler 108 (90 Base) MCG/ACT aerosol powder , Inhale 2 puffs., Disp: , Rfl:     vitamin D (ERGOCALCIFEROL) 1.25 MG (55005 UT) capsule capsule, Take 1 capsule by mouth 1 (One) Time Per Week., Disp: , Rfl:      Objective     Vital Signs:   /74 (BP Location: Right arm, Patient Position: Sitting, Cuff Size: Adult)   Pulse 82   Temp 97.1 °F (36.2 °C) (Temporal)   Resp 18   Ht 152.4 cm (60\")   Wt 65 kg (143 lb 4.8 oz)   SpO2 98% Comment: room air  BMI 27.99 kg/m²     Physical Exam  Constitutional:       General: She is not in acute distress.     Appearance: Normal appearance. She is normal weight. She is not ill-appearing.   HENT:      Right Ear: Tympanic membrane and ear canal normal.      Left Ear: Tympanic membrane and ear canal normal.      Nose: Nose normal.      Mouth/Throat:      Mouth: Mucous membranes are moist.      Pharynx: Oropharynx is clear.   Eyes:      Extraocular " Movements: Extraocular movements intact.      Conjunctiva/sclera: Conjunctivae normal.      Pupils: Pupils are equal, round, and reactive to light.   Cardiovascular:      Rate and Rhythm: Normal rate and regular rhythm.      Pulses: Normal pulses.      Heart sounds: Normal heart sounds.   Pulmonary:      Effort: Pulmonary effort is normal. No respiratory distress.      Breath sounds: Normal breath sounds. No stridor. No wheezing, rhonchi or rales.   Abdominal:      General: Bowel sounds are normal.      Palpations: Abdomen is soft.   Musculoskeletal:         General: No swelling. Normal range of motion.      Cervical back: Normal range of motion and neck supple.      Right lower leg: No edema.      Left lower leg: No edema.   Skin:     General: Skin is warm and dry.   Neurological:      General: No focal deficit present.      Mental Status: She is alert and oriented to person, place, and time.      Motor: No weakness.   Psychiatric:         Mood and Affect: Mood normal.         Behavior: Behavior normal.       Result Review :   I have personally reviewed patient's labs and images.  I also reviewed Dr. Syed's last office note 6/12/2024.            Diagnoses and all orders for this visit:    1. Lung nodule (Primary)  -     MRI Brain With & Without Contrast; Future  -     NM PET/CT Skull Base to Mid Thigh; Future  -     Ambulatory Referral to Cardiothoracic Surgery  -     Complete PFT - Pre & Post Bronchodilator; Future    2. Hemoptysis    3. Weight loss    4. Tobacco use    5. Encounter for smoking cessation counseling       Impression and Plan    -CT chest 3/26/2024 showed a 1 cm spiculated noncalcified nodule in the right upper lobe  -Ion navigational bronchoscopy 6/19/2024, no growth of cultures to date.  Pathology of right upper lobe nodule showed 'atypical cells, suspicious for malignancy'.  Cytology of lung nodule positive for atypical cells.  BAL and bronchial washing cytology negative for malignant cells.   Cytology of lymph node station 7 negative for malignant cells or granulomas.  -Given high suspicion of malignancy, will go ahead and refer patient to cardiothoracic surgery for possible resection of lung nodule.  Discussed case with Dr. Syed, as long as MRI and PET scan come back normal, recommend surgical resection.  Dr. Syed has also requested that the biopsy be sent to [facility] for expert consultation.  -PFTs ordered today to be done before appointment with CT surgery  -Stat brain MRI and PET scan ordered today to complete staging  -Continue using albuterol inhaler as needed  -Smoking cessation counseling provided.  I spent 5 minutes today counseling patient on the risks of smoking, including throat cancer, lung cancer, COPD, heart disease and death.  Also discussed the benefits of quitting.  -Follow up after testing completed    Smoking status: Reviewed  Vaccination status: Patient reports she is up-to-date with her flu, pneumonia, and Covid vaccines.  Patient is advised to continue to follow CDC recommendations such as social distancing wearing a mask and washing hands for at least 20 seconds.  Medications personally reviewed    Follow Up   No follow-ups on file.  Patient was given instructions and counseling regarding her condition or for health maintenance advice. Please see specific information pulled into the AVS if appropriate.

## 2024-06-24 NOTE — TELEPHONE ENCOUNTER
Spoke to patient, she is aware that she has an appointment with Polly on 6/25 to go over results. Patient states she may not have a ride, which I informed her to keep appointment for now, but if she does not she will not have a ride - to call us back to let us know. At that time, would speak to Polly to see if results can be discussed over the phone or if patient needs to R/S. Patient verbalize understanding. Patient did vocalize that she was upset with Dr. Syed, due to not contacting her after the bronch, to which I apologized to the patient about.

## 2024-06-24 NOTE — TELEPHONE ENCOUNTER
Caller: Alyssa Hardy    Relationship: Self    Best call back number: [phone]    Caller requesting test results: PT    What test was performed: PROCEDURE DONE ON HER LUNGS    When was the test performed: 06/19/2024    Where was the test performed: [facility]    Additional notes: PT NEEDS THE RESULTS FROM THAT PROCEDURE. PLEASE CALL THE PT BACK AND SHE WOULD LIKE IT FROM THE DOCTOR.

## 2024-06-25 ENCOUNTER — OFFICE VISIT (OUTPATIENT)
Dept: PULMONOLOGY | Facility: CLINIC | Age: 54
End: 2024-06-25
Payer: COMMERCIAL

## 2024-06-25 VITALS
BODY MASS INDEX: 28.13 KG/M2 | HEIGHT: 60 IN | TEMPERATURE: 97.1 F | HEART RATE: 82 BPM | WEIGHT: 143.3 LBS | DIASTOLIC BLOOD PRESSURE: 74 MMHG | RESPIRATION RATE: 18 BRPM | OXYGEN SATURATION: 98 % | SYSTOLIC BLOOD PRESSURE: 122 MMHG

## 2024-06-25 DIAGNOSIS — Z72.0 TOBACCO USE: ICD-10-CM

## 2024-06-25 DIAGNOSIS — Z71.6 ENCOUNTER FOR SMOKING CESSATION COUNSELING: ICD-10-CM

## 2024-06-25 DIAGNOSIS — R63.4 WEIGHT LOSS: ICD-10-CM

## 2024-06-25 DIAGNOSIS — R91.1 LUNG NODULE: Primary | ICD-10-CM

## 2024-06-25 DIAGNOSIS — R04.2 HEMOPTYSIS: ICD-10-CM

## 2024-06-25 PROCEDURE — 99214 OFFICE O/P EST MOD 30 MIN: CPT

## 2024-06-25 PROCEDURE — 1160F RVW MEDS BY RX/DR IN RCRD: CPT

## 2024-06-25 PROCEDURE — 1159F MED LIST DOCD IN RCRD: CPT

## 2024-06-26 LAB
FUNGUS WND CULT: NORMAL
MYCOBACTERIUM SPEC CULT: NORMAL
NIGHT BLUE STAIN TISS: NORMAL
NIGHT BLUE STAIN TISS: NORMAL

## 2024-06-27 ENCOUNTER — TELEPHONE (OUTPATIENT)
Dept: SURGERY | Facility: CLINIC | Age: 54
End: 2024-06-27
Payer: COMMERCIAL

## 2024-06-27 NOTE — TELEPHONE ENCOUNTER
CALLED PT TO [facility] NEW PT APPT, [facility] HER 7/16/24 BECAUSE SHE WANTED [facility] AND NEEDS A RIDE, PT STATED UNDERSTANDING AND WAS AGREEABLE

## 2024-07-05 LAB
CYTO UR: NORMAL
CYTO UR: NORMAL
LAB AP CASE REPORT: NORMAL
LAB AP CASE REPORT: NORMAL
LAB AP CLINICAL INFORMATION: NORMAL
LAB AP CLINICAL INFORMATION: NORMAL
LAB AP DIAGNOSIS COMMENT: NORMAL
LAB AP DIAGNOSIS COMMENT: NORMAL
PATH REPORT.ADDENDUM SPEC: NORMAL
PATH REPORT.ADDENDUM SPEC: NORMAL
PATH REPORT.FINAL DX SPEC: NORMAL
PATH REPORT.FINAL DX SPEC: NORMAL
PATH REPORT.GROSS SPEC: NORMAL
PATH REPORT.GROSS SPEC: NORMAL

## 2024-07-16 ENCOUNTER — OFFICE VISIT (OUTPATIENT)
Dept: OTHER | Facility: HOSPITAL | Age: 54
End: 2024-07-16
Payer: COMMERCIAL

## 2024-07-16 VITALS
HEART RATE: 86 BPM | DIASTOLIC BLOOD PRESSURE: 71 MMHG | OXYGEN SATURATION: 98 % | SYSTOLIC BLOOD PRESSURE: 116 MMHG | TEMPERATURE: 98.3 F

## 2024-07-16 DIAGNOSIS — R91.1 LUNG NODULE: Primary | ICD-10-CM

## 2024-07-16 PROCEDURE — 1159F MED LIST DOCD IN RCRD: CPT | Performed by: THORACIC SURGERY (CARDIOTHORACIC VASCULAR SURGERY)

## 2024-07-16 PROCEDURE — 1160F RVW MEDS BY RX/DR IN RCRD: CPT | Performed by: THORACIC SURGERY (CARDIOTHORACIC VASCULAR SURGERY)

## 2024-07-16 NOTE — LETTER
"July 18, 2024       No Recipients    Patient: Alyssa Hardy   YOB: 1970   Date of Visit: 7/16/2024     Dear FLACO Berkowitz:       Thank you for referring Alyssa Hardy to me for evaluation. Below are the relevant portions of my assessment and plan of care.    If you have questions, please do not hesitate to call me. I look forward to following Alyssa along with you.         Sincerely,        Kasandra Borrego MD        CC:   No Recipients    Kasandra Borrego MD  07/18/24 1458  Sign when Signing Visit  Chief Complaint  Lung nodule concerning for bronchogenic malignancy.    Subjective       Alyssa Hardy presents to [facility]  History of Present Illness  Ms. Calderon is a pleasant 53-year-old lady with significant PTSD who presents with a right upper lobe nodule concerning for bronchogenic malignancy.  She has undergone a Ion biopsy.  She has had difficulty making it to her appointment secondary to transportation issues.  She has an approximately 45-pack-year history of tobacco use and quit in June 2024.  Objective  Vital Signs:  /71 (BP Location: Left arm, Patient Position: Sitting, Cuff Size: Adult)   Pulse 86   Temp 98.3 °F (36.8 °C)   SpO2 98%   Estimated body mass index is 27.99 kg/m² as calculated from the following:    Height as of 6/25/24: 152.4 cm (60\").    Weight as of 6/25/24: 65 kg (143 lb 4.8 oz).               Physical Exam  Vitals and nursing note reviewed.   Constitutional:       Appearance: She is well-developed.   HENT:      Head: Normocephalic and atraumatic.      Nose: Nose normal.   Eyes:      Conjunctiva/sclera: Conjunctivae normal.   Cardiovascular:      Rate and Rhythm: Normal rate.   Pulmonary:      Effort: Pulmonary effort is normal.   Abdominal:      Palpations: Abdomen is soft.   Musculoskeletal:      Cervical back: Neck supple.   Skin:     General: Skin is warm and dry.   Neurological:      Mental Status: She is alert and oriented to " person, place, and time.   Psychiatric:         Behavior: Behavior normal.         Thought Content: Thought content normal.         Judgment: Judgment normal.        Result Review:            I have independently reviewed the CT of the chest performed on 6/8/2024 which demonstrates a spiculated 11 mm right upper lobe nodule.  No other nodules.  No mediastinal or hilar lymphadenopathy.  No pleural pericardial effusion.    Pathology: Atypical cells concerning for malignancy         Assessment and Plan     Diagnoses and all orders for this visit:    1. Lung nodule (Primary)      Ms. 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  She will need a PET CT scan to rule out metastatic disease as well as a risk assessment with pulmonary function studies and a stress.  She is scheduled for these things in the next few weeks.  I will plan to follow-up with her after she completes all the studies to discuss treatment.  This will likely require local therapy with either surgical resection versus radiation depending on her pulmonary functions.     I spent 30 minutes caring for Alyssa on this date of service. This time includes time spent by me in the following activities:preparing for the visit, reviewing tests, obtaining and/or reviewing a separately obtained history, performing a medically appropriate examination and/or evaluation , counseling and educating the patient/family/caregiver, ordering medications, tests, or procedures, documenting information in the medical record, and independently interpreting results and communicating that information with the patient/family/caregiver  Follow Up     No follow-ups on file.  Patient was given instructions and counseling regarding her condition or for health maintenance advice. Please see specific information pulled into the AVS if appropriate.

## 2024-07-18 NOTE — PROGRESS NOTES
"Chief Complaint  Lung nodule concerning for bronchogenic malignancy.    Subjective        Alyssa Hardy presents to [facility]  History of Present Illness  Ms. Calderon is a pleasant 53-year-old lady with significant PTSD who presents with a right upper lobe nodule concerning for bronchogenic malignancy.  She has undergone a Ion biopsy.  She has had difficulty making it to her appointment secondary to transportation issues.  She has an approximately 45-pack-year history of tobacco use and quit in June 2024.  Objective   Vital Signs:  /71 (BP Location: Left arm, Patient Position: Sitting, Cuff Size: Adult)   Pulse 86   Temp 98.3 °F (36.8 °C)   SpO2 98%   Estimated body mass index is 27.99 kg/m² as calculated from the following:    Height as of 6/25/24: 152.4 cm (60\").    Weight as of 6/25/24: 65 kg (143 lb 4.8 oz).               Physical Exam  Vitals and nursing note reviewed.   Constitutional:       Appearance: She is well-developed.   HENT:      Head: Normocephalic and atraumatic.      Nose: Nose normal.   Eyes:      Conjunctiva/sclera: Conjunctivae normal.   Cardiovascular:      Rate and Rhythm: Normal rate.   Pulmonary:      Effort: Pulmonary effort is normal.   Abdominal:      Palpations: Abdomen is soft.   Musculoskeletal:      Cervical back: Neck supple.   Skin:     General: Skin is warm and dry.   Neurological:      Mental Status: She is alert and oriented to person, place, and time.   Psychiatric:         Behavior: Behavior normal.         Thought Content: Thought content normal.         Judgment: Judgment normal.        Result Review :            I have independently reviewed the CT of the chest performed on 6/8/2024 which demonstrates a spiculated 11 mm right upper lobe nodule.  No other nodules.  No mediastinal or hilar lymphadenopathy.  No pleural pericardial effusion.    Pathology: Atypical cells concerning for malignancy         Assessment and Plan     Diagnoses " and all orders for this visit:    1. Lung nodule (Primary)      Ms. 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  She will need a PET CT scan to rule out metastatic disease as well as a risk assessment with pulmonary function studies and a stress.  She is scheduled for these things in the next few weeks.  I will plan to follow-up with her after she completes all the studies to discuss treatment.  This will likely require local therapy with either surgical resection versus radiation depending on her pulmonary functions.     I spent 30 minutes caring for Alyssa on this date of service. This time includes time spent by me in the following activities:preparing for the visit, reviewing tests, obtaining and/or reviewing a separately obtained history, performing a medically appropriate examination and/or evaluation , counseling and educating the patient/family/caregiver, ordering medications, tests, or procedures, documenting information in the medical record, and independently interpreting results and communicating that information with the patient/family/caregiver  Follow Up     No follow-ups on file.  Patient was given instructions and counseling regarding her condition or for health maintenance advice. Please see specific information pulled into the AVS if appropriate.

## 2024-07-24 LAB
MYCOBACTERIUM SPEC CULT: NORMAL
NIGHT BLUE STAIN TISS: NORMAL
NIGHT BLUE STAIN TISS: NORMAL

## 2024-07-31 LAB
MYCOBACTERIUM SPEC CULT: NORMAL
NIGHT BLUE STAIN TISS: NORMAL
NIGHT BLUE STAIN TISS: NORMAL

## 2024-08-05 ENCOUNTER — HOSPITAL ENCOUNTER (OUTPATIENT)
Dept: PET IMAGING | Facility: HOSPITAL | Age: 54
Discharge: HOME | End: 2024-08-05
Payer: COMMERCIAL

## 2024-08-05 ENCOUNTER — HOSPITAL ENCOUNTER (OUTPATIENT)
Dept: MRI IMAGING | Facility: HOSPITAL | Age: 54
Discharge: HOME | End: 2024-08-05
Payer: COMMERCIAL

## 2024-08-05 DIAGNOSIS — R91.1 LUNG NODULE: ICD-10-CM

## 2024-08-05 LAB
CREAT BLDA-MCNC: 0.3 MG/DL (ref 0.6–1.3)
EGFRCR SERPLBLD CKD-EPI 2021: 126.2 ML/MIN/1.73

## 2024-08-05 PROCEDURE — 70553 MRI BRAIN STEM W/O & W/DYE: CPT

## 2024-08-05 PROCEDURE — 0 GADOBENATE DIMEGLUMINE 529 MG/ML SOLUTION

## 2024-08-05 PROCEDURE — 82565 ASSAY OF CREATININE: CPT

## 2024-08-05 PROCEDURE — A9577 INJ MULTIHANCE: HCPCS

## 2024-08-05 RX ADMIN — GADOBENATE DIMEGLUMINE 14 ML: 529 INJECTION, SOLUTION INTRAVENOUS at 11:30

## 2024-08-06 ENCOUNTER — TELEPHONE (OUTPATIENT)
Dept: SURGERY | Facility: CLINIC | Age: 54
End: 2024-08-06
Payer: COMMERCIAL

## 2024-08-06 ENCOUNTER — DOCUMENTATION (OUTPATIENT)
Dept: SURGERY | Facility: CLINIC | Age: 54
End: 2024-08-06
Payer: COMMERCIAL

## 2024-08-06 NOTE — TELEPHONE ENCOUNTER
Our NP will look into this situation to see what can be done and will call the patent back    DB 3:03  8/6/2024    I called pt to advise  her to just keep her 8/12/2024 appt and if her glucose is still high we will consult Dr Borrego for next course of action    DB 2:39  8/6/2024      Caller: Pamela Hardy    Relationship: Self    Best call back number: [phone]     Who are you requesting to speak with (clinical staff, provider,  specific staff member): DR. BORREGO       What was the call regarding: PATIENT HAS NOT BEEN ABLE TO COMPLETE TESTING DUE TO HIGH SUGAR. PATIENT STATES THAT SHE RESCHEDULED TESTING TO 8/12/24 BUT IS CONCERNED HER SUGAR WILL STILL BE HIGH.     PATIENT WOULD LIKE ADVISEMENT ON NEXT STEPS IF SHE IS UNABLE TO COMPLETE TESTING.     Is it okay if the provider responds through MyChart: PATIENT WOULD LIKE A CALL BACK.

## 2024-08-06 NOTE — TELEPHONE ENCOUNTER
Caller: Pamela Hardy    Relationship: Self    Best call back number: [phone]     Who are you requesting to speak with (clinical staff, provider,  specific staff member): DR. COLLADO       What was the call regarding: PATIENT HAS NOT BEEN ABLE TO COMPLETE TESTING DUE TO HIGH SUGAR. PATIENT STATES THAT SHE RESCHEDULED TESTING TO 8/12/24 BUT IS CONCERNED HER SUGAR WILL STILL BE HIGH.     PATIENT WOULD LIKE ADVISEMENT ON NEXT STEPS IF SHE IS UNABLE TO COMPLETE TESTING.     Is it okay if the provider responds through MyChart: PATIENT WOULD LIKE A CALL BACK.

## 2024-08-06 NOTE — TELEPHONE ENCOUNTER
I SPOKE WITH ERINN REGARDING THE ISSUES WE CALLED PT BACK AND I INFORMED HER THAT SHE HAS NOT ESTABLISHED CARE WITH US YET. WE RECOMMEND  SHE CONTACT THE PCP TO GET MORE ANSWERS REGARDING THE GLUCOSE ISSUE BECAUSE WE HAVE NOT SEEN HER IN OFFICE YET. PT UNDERSTOOD VERBALLY AND WILL CONTACT PCP STATED.

## 2024-08-06 NOTE — PROGRESS NOTES
Patient reach out to us today regarding issues she had with her PET/CT scan.  She reports her blood sugar was too high for the scan to be performed.  Patient saw Dr. Borrego on 7/16/2024  who recommends PET/CT scan.  After speaking with the patient she reports that she did reach out to her PCP for help with managing her blood sugar and she reports her PCP has given her instructions as well as provided a glucometer for her for better blood sugar control.  I have instructed her to reach out to her PCP should she have further issues with her blood sugar as well as to reach out to us should she have any other questions regarding her PET scan.  Her PET CT scan is re-scheduled for 8/12/2024 and a subsequent follow-up with us on 8/19/2024.    FLACO Granados

## 2024-08-07 ENCOUNTER — HOSPITAL ENCOUNTER (OUTPATIENT)
Dept: RESPIRATORY THERAPY | Facility: HOSPITAL | Age: 54
Discharge: HOME | End: 2024-08-07
Payer: COMMERCIAL

## 2024-08-07 DIAGNOSIS — R91.1 LUNG NODULE: ICD-10-CM

## 2024-08-07 PROCEDURE — 94729 DIFFUSING CAPACITY: CPT

## 2024-08-07 PROCEDURE — 94726 PLETHYSMOGRAPHY LUNG VOLUMES: CPT

## 2024-08-07 PROCEDURE — 94060 EVALUATION OF WHEEZING: CPT

## 2024-08-07 RX ORDER — ALBUTEROL SULFATE 2.5 MG/3ML
2.5 SOLUTION RESPIRATORY (INHALATION) ONCE
Status: COMPLETED | OUTPATIENT
Start: 2024-08-07 | End: 2024-08-07

## 2024-08-07 RX ADMIN — ALBUTEROL SULFATE 2.5 MG: 2.5 SOLUTION RESPIRATORY (INHALATION) at 14:01

## 2024-08-12 ENCOUNTER — HOSPITAL ENCOUNTER (OUTPATIENT)
Dept: PET IMAGING | Facility: HOSPITAL | Age: 54
Discharge: HOME | End: 2024-08-12
Payer: COMMERCIAL

## 2024-08-12 PROCEDURE — 0 FLUDEOXYGLUCOSE F18 SOLUTION

## 2024-08-12 PROCEDURE — A9552 F18 FDG: HCPCS

## 2024-08-12 PROCEDURE — 78815 PET IMAGE W/CT SKULL-THIGH: CPT

## 2024-08-12 RX ADMIN — FLUDEOXYGLUCOSE F 18 1 DOSE: 200 INJECTION, SOLUTION INTRAVENOUS at 08:10

## 2024-09-03 ENCOUNTER — OFFICE VISIT (OUTPATIENT)
Dept: OTHER | Facility: HOSPITAL | Age: 54
End: 2024-09-03
Payer: COMMERCIAL

## 2024-09-03 VITALS
OXYGEN SATURATION: 97 % | WEIGHT: 144.4 LBS | BODY MASS INDEX: 28.2 KG/M2 | DIASTOLIC BLOOD PRESSURE: 84 MMHG | SYSTOLIC BLOOD PRESSURE: 136 MMHG | HEART RATE: 94 BPM

## 2024-09-03 DIAGNOSIS — R91.1 LUNG NODULE: Primary | ICD-10-CM

## 2024-09-03 DIAGNOSIS — B02.23 SHINGLES (HERPES ZOSTER) POLYNEUROPATHY: ICD-10-CM

## 2024-09-03 PROCEDURE — 1160F RVW MEDS BY RX/DR IN RCRD: CPT | Performed by: THORACIC SURGERY (CARDIOTHORACIC VASCULAR SURGERY)

## 2024-09-03 PROCEDURE — 1159F MED LIST DOCD IN RCRD: CPT | Performed by: THORACIC SURGERY (CARDIOTHORACIC VASCULAR SURGERY)

## 2024-09-03 RX ORDER — GABAPENTIN 300 MG/1
300 CAPSULE ORAL 3 TIMES DAILY
Qty: 60 CAPSULE | Refills: 0 | Status: SHIPPED | OUTPATIENT
Start: 2024-09-03

## 2024-09-03 NOTE — LETTER
"September 6, 2024       No Recipients    Patient: Pamela Hardy   YOB: 1970   Date of Visit: 9/3/2024     Dear FLACO Berkowitz:       Thank you for referring Pamela Hardy to me for evaluation. Below are the relevant portions of my assessment and plan of care.    If you have questions, please do not hesitate to call me. I look forward to following Pamela along with you.         Sincerely,        Kasandra Borrego MD        CC:   No Recipients    Kasandra Borrego MD  09/06/24 1825  Sign when Signing Visit  Chief Complaint  Lung nodule    Subjective       Pamela Hardy presents to [facility]  History of Present Illness  Ms. Hardy is a pleasant 53-year-old lady who presents to discuss her recent PET CT scan and pulmonary function studies.  Objective  Vital Signs:  /84   Pulse 94   Wt 65.5 kg (144 lb 6.4 oz)   SpO2 97%   BMI 28.20 kg/m²   Estimated body mass index is 28.2 kg/m² as calculated from the following:    Height as of 6/25/24: 152.4 cm (60\").    Weight as of this encounter: 65.5 kg (144 lb 6.4 oz).            Physical Exam  Vitals and nursing note reviewed.   Constitutional:       Appearance: She is well-developed.   HENT:      Head: Normocephalic and atraumatic.      Nose: Nose normal.   Eyes:      Conjunctiva/sclera: Conjunctivae normal.   Cardiovascular:      Rate and Rhythm: Normal rate.   Pulmonary:      Effort: Pulmonary effort is normal.   Abdominal:      Palpations: Abdomen is soft.   Musculoskeletal:      Cervical back: Neck supple.   Skin:     General: Skin is warm and dry.   Neurological:      Mental Status: She is alert and oriented to person, place, and time.   Psychiatric:         Behavior: Behavior normal.         Thought Content: Thought content normal.         Judgment: Judgment normal.        Result Review:          I have independently reviewed the PET scan performed on 8/12/2024 which demonstrates a 1.1 cm right upper lobe lung " nodule in the apex of the right upper lobe with an SUV of 1.34 mm right lower lobe nodule without hypermetabolism.    Pulmonary function studies demonstrate an FEV1 of 1.9 or 82% predicted and a DLCO of 11 or 63% predicted     Assessment and Plan   Diagnoses and all orders for this visit:    1. Lung nodule (Primary)  -     Ambulatory Referral to Radiation Oncology    2. Shingles (herpes zoster) polyneuropathy  -     gabapentin (NEURONTIN) 300 MG capsule; Take 1 capsule by mouth 3 (Three) Times a Day.  Dispense: 60 capsule; Refill: 0      MsEv Calderon is a pleasant 53-year-old lady with a concerning right upper lobe lung nodule measuring 1.1 cm.  This would be a T1b N0 M0 bronchogenic malignancy if it were in fact a malignancy.  She has had a biopsy which demonstrated atypical cells concerning for malignancy.  She would be a candidate for curative intent resection based on her pulmonary function studies.  We will plan a robotic assisted wedge resection of the right upper lobe for diagnosis with possible right upper lobectomy without diagnosis if I am unable to resect with a wedge resection.  Risks and benefits of this procedure were discussed with the patient today including pneumonia, prolonged air leak and atrial fibrillation.    She is very anxious regarding surgical resection.  We did also discuss stereotactic radiation and I have placed a referral to Dr. Solis.  She is worried about her support system as well as the procedure itself.  She will plan to call the office after she sees Dr. Solis to let us know if she would like to proceed with surgery.       I spent 45 minutes caring for Pamela on this date of service. This time includes time spent by me in the following activities:preparing for the visit, reviewing tests, obtaining and/or reviewing a separately obtained history, performing a medically appropriate examination and/or evaluation , counseling and educating the patient/family/caregiver, ordering  medications, tests, or procedures, documenting information in the medical record, and independently interpreting results and communicating that information with the patient/family/caregiver  Follow Up   No follow-ups on file.  Patient was given instructions and counseling regarding her condition or for health maintenance advice. Please see specific information pulled into the AVS if appropriate.

## 2024-09-03 NOTE — PROGRESS NOTES
"Chief Complaint  Lung nodule    Subjective        Pamela Hardy presents to [facility]  History of Present Illness  Ms. Hardy is a pleasant 53-year-old lady who presents to discuss her recent PET CT scan and pulmonary function studies.  Objective   Vital Signs:  /84   Pulse 94   Wt 65.5 kg (144 lb 6.4 oz)   SpO2 97%   BMI 28.20 kg/m²   Estimated body mass index is 28.2 kg/m² as calculated from the following:    Height as of 6/25/24: 152.4 cm (60\").    Weight as of this encounter: 65.5 kg (144 lb 6.4 oz).            Physical Exam  Vitals and nursing note reviewed.   Constitutional:       Appearance: She is well-developed.   HENT:      Head: Normocephalic and atraumatic.      Nose: Nose normal.   Eyes:      Conjunctiva/sclera: Conjunctivae normal.   Cardiovascular:      Rate and Rhythm: Normal rate.   Pulmonary:      Effort: Pulmonary effort is normal.   Abdominal:      Palpations: Abdomen is soft.   Musculoskeletal:      Cervical back: Neck supple.   Skin:     General: Skin is warm and dry.   Neurological:      Mental Status: She is alert and oriented to person, place, and time.   Psychiatric:         Behavior: Behavior normal.         Thought Content: Thought content normal.         Judgment: Judgment normal.        Result Review :          I have independently reviewed the PET scan performed on 8/12/2024 which demonstrates a 1.1 cm right upper lobe lung nodule in the apex of the right upper lobe with an SUV of 1.34 mm right lower lobe nodule without hypermetabolism.    Pulmonary function studies demonstrate an FEV1 of 1.9 or 82% predicted and a DLCO of 11 or 63% predicted     Assessment and Plan   Diagnoses and all orders for this visit:    1. Lung nodule (Primary)  -     Ambulatory Referral to Radiation Oncology    2. Shingles (herpes zoster) polyneuropathy  -     gabapentin (NEURONTIN) 300 MG capsule; Take 1 capsule by mouth 3 (Three) Times a Day.  Dispense: 60 capsule; " Refill: 0      Ms. Calderon is a pleasant 53-year-old lady with a concerning right upper lobe lung nodule measuring 1.1 cm.  This would be a T1b N0 M0 bronchogenic malignancy if it were in fact a malignancy.  She has had a biopsy which demonstrated atypical cells concerning for malignancy.  She would be a candidate for curative intent resection based on her pulmonary function studies.  We will plan a robotic assisted wedge resection of the right upper lobe for diagnosis with possible right upper lobectomy without diagnosis if I am unable to resect with a wedge resection.  Risks and benefits of this procedure were discussed with the patient today including pneumonia, prolonged air leak and atrial fibrillation.    She is very anxious regarding surgical resection.  We did also discuss stereotactic radiation and I have placed a referral to Dr. Solis.  She is worried about her support system as well as the procedure itself.  She will plan to call the office after she sees Dr. Solis to let us know if she would like to proceed with surgery.       I spent 45 minutes caring for Pamela on this date of service. This time includes time spent by me in the following activities:preparing for the visit, reviewing tests, obtaining and/or reviewing a separately obtained history, performing a medically appropriate examination and/or evaluation , counseling and educating the patient/family/caregiver, ordering medications, tests, or procedures, documenting information in the medical record, and independently interpreting results and communicating that information with the patient/family/caregiver  Follow Up   No follow-ups on file.  Patient was given instructions and counseling regarding her condition or for health maintenance advice. Please see specific information pulled into the AVS if appropriate.

## 2024-09-06 DIAGNOSIS — R07.89 OTHER CHEST PAIN: Primary | ICD-10-CM

## 2024-09-12 ENCOUNTER — CONSULT (OUTPATIENT)
Dept: RADIATION ONCOLOGY | Facility: HOSPITAL | Age: 54
End: 2024-09-12
Payer: COMMERCIAL

## 2024-09-12 VITALS
SYSTOLIC BLOOD PRESSURE: 107 MMHG | WEIGHT: 147.05 LBS | TEMPERATURE: 98.6 F | DIASTOLIC BLOOD PRESSURE: 79 MMHG | HEART RATE: 92 BPM | OXYGEN SATURATION: 93 % | RESPIRATION RATE: 16 BRPM | BODY MASS INDEX: 28.72 KG/M2

## 2024-09-12 DIAGNOSIS — R91.1 LUNG NODULE: Primary | ICD-10-CM

## 2024-09-12 PROCEDURE — G0463 HOSPITAL OUTPT CLINIC VISIT: HCPCS | Performed by: RADIOLOGY

## 2024-09-12 NOTE — PROGRESS NOTES
"     New Patient Office Visit      Encounter Date: 09/12/2024   Patient Name: Pamela Hardy  YOB: 1970   Medical Record Number: [id number]   Primary Diagnosis: Lung nodule [R91.1]         Chief Complaint:    Chief Complaint   Patient presents with    Consult    Lung Cancer       History of Present Illness: Pamela Hardy is a 53-year-old female with a right upper lobe nodule concerning for primary pulmonary malignancy who is seen in consultation.  She underwent CT scan of the chest on 3/26/2024 revealing a 1 cm spiculated nodule within the right upper lobe.  She subsequently underwent evaluation by Dr. Syed and underwent bronchoscopy and biopsy on 6/19/2024 with pathology revealing atypical cells suspicious for malignancy.  She underwent pulmonary function testing as well as evaluation by Dr. Borrego and appears to be a good surgical candidate.  PET/CT reveals this right upper lobe nodule to measure 1.1 cm in greatest dimension with max SUV 1.3, slightly greater than lung background.    Subjective      Review of Systems: Review of Systems   Constitutional:  Positive for fatigue and unexpected weight change (20+lbs is takimg Ozempic). Negative for appetite change.   HENT:  Positive for trouble swallowing (has had esophageal dilation). Negative for sore throat and tinnitus.    Eyes:  Positive for visual disturbance (wears glasses).   Respiratory:  Positive for shortness of breath (with exertion). Negative for cough.    Cardiovascular:  Positive for palpitations. Negative for chest pain and leg swelling.        MI - \" long time ago\"   Gastrointestinal:  Positive for abdominal distention, anal bleeding (hemmorhoids), constipation (last bm few days ago) and nausea. Negative for blood in stool, diarrhea and rectal pain.   Genitourinary:  Negative for difficulty urinating, dysuria, frequency and urgency.   Musculoskeletal:  Negative for arthralgias, back pain and gait problem.   Skin:  Negative for color change and " rash.   Neurological:  Positive for dizziness (with postural changes). Negative for weakness and headaches.        PTSD   Psychiatric/Behavioral:  Positive for sleep disturbance (difficulty falling asleep and difficulty staying asleep). Negative for agitation, self-injury and suicidal ideas. The patient is nervous/anxious.        Past Medical History:   Past Medical History:   Diagnosis Date    Anesthesia     Pt WOKE UP ONCE    CAD (coronary artery disease)     Diabetes mellitus     [place] (hard of hearing)     Hypertension     IBS (irritable bowel syndrome)     Kidney atrophy     PTSD (post-traumatic stress disorder)     Pulmonary nodule     PVD (peripheral vascular disease) with claudication     Shortness of breath     Smoker     Tachycardia     Thyroid goiter        Past Surgical History:   Past Surgical History:   Procedure Laterality Date    BRONCHOSCOPY WITH ION ROBOTIC ASSIST N/A 6/19/2024    Procedure: BRONCHOSCOPY NAVIGATION WITH ENDOBRONCHIAL ULTRASOUND AND ION ROBOT, REBUS, EBUS, BAL, WASHINGS, NEEDLE ASPIRATES, BIOPSIES;  Surgeon: Ganga Syed MD;  Location: [facility] (formerly [facility]);  Service: Robotics - Pulmonary;  Laterality: N/A;    CARDIAC CATHETERIZATION      PER DR. COLE NOTE APPROX 2008/2009    CHOLECYSTECTOMY      ECTOPIC PREGNANCY SURGERY      SALPINGECTOMY Left     TONSILLECTOMY         Family History:   Family History   Problem Relation Age of Onset    Cancer Father     Malig Hyperthermia Neg Hx        Social History:   Social History     Socioeconomic History    Marital status: Unknown   Tobacco Use    Smoking status: Every Day     Current packs/day: 0.25     Average packs/day: 0.3 packs/day for 42.7 years (10.7 ttl pk-yrs)     Types: Cigarettes     Start date: 1982     Passive exposure: Never    Smokeless tobacco: Former   Vaping Use    Vaping status: Never Used   Substance and Sexual Activity    Alcohol use: Never    Drug use: Never    Sexual activity: Defer       Medications:     Current Outpatient  Medications:     aspirin 81 MG EC tablet, Take 1 tablet by mouth Daily., Disp: , Rfl:     esomeprazole (nexIUM) 40 MG capsule, , Disp: , Rfl:     Linzess 72 MCG capsule capsule, Take 1 capsule by mouth Daily., Disp: , Rfl:     nadolol (CORGARD) 20 MG tablet, Take 1 tablet by mouth Daily., Disp: , Rfl:     Ozempic, 0.25 or 0.5 MG/DOSE, 2 MG/3ML solution pen-injector, Inject 0.5 mg under the skin into the appropriate area as directed., Disp: , Rfl:     ProAir Digihaler 108 (90 Base) MCG/ACT aerosol powder , Inhale 2 puffs., Disp: , Rfl:     vitamin D (ERGOCALCIFEROL) 1.25 MG (35805 UT) capsule capsule, Take 1 capsule by mouth 1 (One) Time Per Week., Disp: , Rfl:     gabapentin (NEURONTIN) 300 MG capsule, Take 1 capsule by mouth 3 (Three) Times a Day. (Patient not taking: Reported on 9/12/2024), Disp: 60 capsule, Rfl: 0    Allergies:   Allergies   Allergen Reactions    Hydrocodone-Acetaminophen Palpitations    Latex Swelling    Other Swelling     PLASTIC ALL TYPES OF PLASTIC    Cilostazol Other (See Comments)     tachycardia    Ibuprofen Other (See Comments)     Liver problems    Iodinated Contrast Media Unknown - Low Severity    Sulfamethoxazole-Trimethoprim Other (See Comments)     Dizziness, shortness of breath, chest pain    Codeine Palpitations    Penicillins Palpitations     CHEST PAIN        Pain: (on a scale of 0-10)   Pain Score    09/12/24 1338   PainSc: 10-Worst pain ever   PainLoc: Breast  Comment: right underarm to breast       Pamela Hayley reports a pain score of 10.  Given her pain assessment as noted, treatment options were discussed and the following options were decided upon as a follow-up plan to address the patient's pain: continuation of current treatment plan for pain.     Advanced Care Plan: N   Quality of Life: 100 - Full Activity    Objective     Physical Exam:   Vital Signs:   Vitals:    09/12/24 1338   BP: 107/79   Pulse: 92   Resp: 16   Temp: 98.6 °F (37 °C)   TempSrc: Temporal   SpO2: 93%    Weight: 66.7 kg (147 lb 0.8 oz)   PainSc: 10-Worst pain ever   PainLoc: Breast  Comment: right underarm to breast     Body mass index is 28.72 kg/m².     Physical Exam  Constitutional:       General: She is not in acute distress.     Appearance: Normal appearance. She is normal weight. She is not ill-appearing, toxic-appearing or diaphoretic.   HENT:      Head: Normocephalic and atraumatic.   Pulmonary:      Effort: Pulmonary effort is normal. No respiratory distress.   Skin:     General: Skin is warm and dry.      Coloration: Skin is not jaundiced.      Findings: No bruising or lesion.   Neurological:      General: No focal deficit present.      Mental Status: She is alert and oriented to person, place, and time.      Cranial Nerves: No cranial nerve deficit.      Gait: Gait normal.   Psychiatric:         Judgment: Judgment normal.         Results:   Radiographs: I personally reviewed the PET/CT from 8/12/2024.  The pertinent findings are as above in HPI.    Pathology: I personally reviewed the pathology report from the procedure performed on 6/19/2024.  The pertinent findings are as above in HPI.        Assessment / Plan      Assessment/Plan:   Pamela Hardy is a 53-year-old female with a right upper lobe nodule concerning for primary pulmonary malignancy.  Pathology reveals atypical cells concerning for malignancy.  She has no clinical or radiographic evidence of regional or distant metastatic disease.  ECOG 0    I discussed the clinical, radiographic and pathologic findings to date with Ms. Hardy in plain terms.  I discussed the rationale for the delivery of external beam radiotherapy for the medically inoperable or those to decline surgery.  I recommended repeat evaluation by Dr. Borrego for surgical intervention as I would not favor SBRT as the preferred management strategy.  She was encouraged to contact me with any questions or concerns regarding her care.        Gary Solis MD  Radiation Oncology  [facility]  [facility] [place]    This document has been signed by Gary Solis MD on September 12, 2024 16:22 EDT

## 2024-09-17 ENCOUNTER — PREP FOR SURGERY (OUTPATIENT)
Dept: OTHER | Facility: HOSPITAL | Age: 54
End: 2024-09-17
Payer: COMMERCIAL

## 2024-09-17 ENCOUNTER — OFFICE VISIT (OUTPATIENT)
Dept: OTHER | Facility: HOSPITAL | Age: 54
End: 2024-09-17
Payer: COMMERCIAL

## 2024-09-17 VITALS
DIASTOLIC BLOOD PRESSURE: 77 MMHG | WEIGHT: 144 LBS | SYSTOLIC BLOOD PRESSURE: 120 MMHG | HEART RATE: 99 BPM | OXYGEN SATURATION: 97 % | BODY MASS INDEX: 28.12 KG/M2

## 2024-09-17 DIAGNOSIS — R91.1 LUNG NODULE: Primary | ICD-10-CM

## 2024-09-17 PROCEDURE — 99215 OFFICE O/P EST HI 40 MIN: CPT | Performed by: THORACIC SURGERY (CARDIOTHORACIC VASCULAR SURGERY)

## 2024-09-17 PROCEDURE — 1160F RVW MEDS BY RX/DR IN RCRD: CPT | Performed by: THORACIC SURGERY (CARDIOTHORACIC VASCULAR SURGERY)

## 2024-09-17 PROCEDURE — 1159F MED LIST DOCD IN RCRD: CPT | Performed by: THORACIC SURGERY (CARDIOTHORACIC VASCULAR SURGERY)

## 2024-09-17 RX ORDER — HEPARIN SODIUM 5000 [USP'U]/ML
5000 INJECTION, SOLUTION INTRAVENOUS; SUBCUTANEOUS ONCE
OUTPATIENT
Start: 2024-09-17 | End: 2024-09-17

## 2024-09-17 RX ORDER — BLOOD-GLUCOSE METER
1 KIT MISCELLANEOUS AS NEEDED
COMMUNITY
Start: 2024-09-14

## 2024-09-17 RX ORDER — PREDNISONE 10 MG/1
20 TABLET ORAL DAILY
COMMUNITY
Start: 2024-09-16

## 2024-09-17 RX ORDER — SODIUM CHLORIDE 9 MG/ML
40 INJECTION, SOLUTION INTRAVENOUS AS NEEDED
OUTPATIENT
Start: 2024-09-17

## 2024-09-17 RX ORDER — ALBUTEROL SULFATE 90 UG/1
2 AEROSOL, METERED RESPIRATORY (INHALATION)
COMMUNITY
Start: 2024-09-14

## 2024-09-17 RX ORDER — GABAPENTIN 300 MG/1
600 CAPSULE ORAL ONCE
OUTPATIENT
Start: 2024-09-17 | End: 2024-09-17

## 2024-09-17 RX ORDER — IBUPROFEN 800 MG/1
800 TABLET, FILM COATED ORAL EVERY 6 HOURS PRN
COMMUNITY
Start: 2024-09-16 | End: 2024-09-27

## 2024-09-17 RX ORDER — ACETAMINOPHEN 500 MG
1000 TABLET ORAL ONCE
OUTPATIENT
Start: 2024-09-17 | End: 2024-09-17

## 2024-09-17 RX ORDER — SODIUM CHLORIDE 0.9 % (FLUSH) 0.9 %
3-10 SYRINGE (ML) INJECTION AS NEEDED
OUTPATIENT
Start: 2024-09-17

## 2024-09-17 RX ORDER — SODIUM CHLORIDE 0.9 % (FLUSH) 0.9 %
3 SYRINGE (ML) INJECTION EVERY 12 HOURS SCHEDULED
OUTPATIENT
Start: 2024-09-17

## 2024-10-02 ENCOUNTER — TELEPHONE (OUTPATIENT)
Dept: ONCOLOGY | Facility: HOSPITAL | Age: 54
End: 2024-10-02
Payer: COMMERCIAL

## 2024-10-02 NOTE — TELEPHONE ENCOUNTER
OSW contacted patient via telephone to review her barriers to care or unmet needs and high distress screening score of 10 with concerns listed for pain, sleep, fatigue, tobacco use, loss or change of physical abilities, worry or anxiety, sadness or depression, fear, and loneliness.  Patient stated that she has a limited support system to assist with transporting her to and from her surgery.  Patient stated her surgeon feels that she will not need any other treatment once the cancer is extracted.  Patient stated she wished she had more options than having surgery due to having PTSD.  Patient stated that she does not like psychiatric services and therefore is not seeing a psychiatrist for her depression or anxiety.  Patient stated she does go to [facility] counselor to address her behavioral health concerns.  Patient stated that it helps her when she has treatment through [facility] counselors.  Patient stated she owns a car but struggles with gas, food, and paying monthly bills.  Patient reported she utilizes community action, food pantries, and has SNAP benefits through department of community-based services.  OSW reminded patient to sign up for the LIHE[employer] program through [employer] in November. Patient stated her insurance does not provide her with transportation services without a cost.  Patient stated she lives alone and will need home health services following her surgery.  Patient stated she would try to find someone who could go with her on the day of her surgery.  Patient stated she drives herself and has a support animal.  Patient states she has tried to address her barriers to care or unmet needs and usually finds a way to get to her appointments and to follow-up with treatment. OSW reminded patient she is a survivor and patient agreed she has survived her whole life on limited supports and services. Patient laughed and thanked OSW for calling her today. OSW provided patient with emotional  support through actively listening, empathizing, normalizing, and validating patient's thoughts and feelings.

## 2024-10-22 ENCOUNTER — PRE-ADMISSION TESTING (OUTPATIENT)
Dept: PREADMISSION TESTING | Facility: HOSPITAL | Age: 54
DRG: 165 | End: 2024-10-22
Payer: COMMERCIAL

## 2024-10-22 VITALS
BODY MASS INDEX: 28.51 KG/M2 | TEMPERATURE: 98.2 F | HEART RATE: 90 BPM | WEIGHT: 145.2 LBS | DIASTOLIC BLOOD PRESSURE: 98 MMHG | OXYGEN SATURATION: 99 % | SYSTOLIC BLOOD PRESSURE: 147 MMHG | RESPIRATION RATE: 20 BRPM | HEIGHT: 60 IN

## 2024-10-22 DIAGNOSIS — R91.1 LUNG NODULE: ICD-10-CM

## 2024-10-22 LAB
ABO GROUP BLD: NORMAL
ANION GAP SERPL CALCULATED.3IONS-SCNC: 6.5 MMOL/L (ref 5–15)
BLD GP AB SCN SERPL QL: NEGATIVE
BUN SERPL-MCNC: 12 MG/DL (ref 6–20)
BUN/CREAT SERPL: 16.7 (ref 7–25)
CALCIUM SPEC-SCNC: 9.4 MG/DL (ref 8.6–10.5)
CHLORIDE SERPL-SCNC: 102 MMOL/L (ref 98–107)
CO2 SERPL-SCNC: 26.5 MMOL/L (ref 22–29)
CREAT SERPL-MCNC: 0.72 MG/DL (ref 0.57–1)
DEPRECATED RDW RBC AUTO: 42.3 FL (ref 37–54)
EGFRCR SERPLBLD CKD-EPI 2021: 99.5 ML/MIN/1.73
ERYTHROCYTE [DISTWIDTH] IN BLOOD BY AUTOMATED COUNT: 12.9 % (ref 12.3–15.4)
GLUCOSE SERPL-MCNC: 246 MG/DL (ref 65–99)
HCT VFR BLD AUTO: 43.2 % (ref 34–46.6)
HGB BLD-MCNC: 15.1 G/DL (ref 12–15.9)
MCH RBC QN AUTO: 31.6 PG (ref 26.6–33)
MCHC RBC AUTO-ENTMCNC: 35 G/DL (ref 31.5–35.7)
MCV RBC AUTO: 90.4 FL (ref 79–97)
PLATELET # BLD AUTO: 240 10*3/MM3 (ref 140–450)
PMV BLD AUTO: 11.2 FL (ref 6–12)
POTASSIUM SERPL-SCNC: 4.4 MMOL/L (ref 3.5–5.2)
QT INTERVAL: 344 MS
QTC INTERVAL: 409 MS
RBC # BLD AUTO: 4.78 10*6/MM3 (ref 3.77–5.28)
RH BLD: POSITIVE
SODIUM SERPL-SCNC: 135 MMOL/L (ref 136–145)
T&S EXPIRATION DATE: NORMAL
WBC NRBC COR # BLD AUTO: 9.14 10*3/MM3 (ref 3.4–10.8)

## 2024-10-22 PROCEDURE — 85027 COMPLETE CBC AUTOMATED: CPT | Performed by: THORACIC SURGERY (CARDIOTHORACIC VASCULAR SURGERY)

## 2024-10-22 PROCEDURE — 86850 RBC ANTIBODY SCREEN: CPT | Performed by: THORACIC SURGERY (CARDIOTHORACIC VASCULAR SURGERY)

## 2024-10-22 PROCEDURE — 80048 BASIC METABOLIC PNL TOTAL CA: CPT | Performed by: THORACIC SURGERY (CARDIOTHORACIC VASCULAR SURGERY)

## 2024-10-22 PROCEDURE — 86900 BLOOD TYPING SEROLOGIC ABO: CPT | Performed by: THORACIC SURGERY (CARDIOTHORACIC VASCULAR SURGERY)

## 2024-10-22 PROCEDURE — 86901 BLOOD TYPING SEROLOGIC RH(D): CPT | Performed by: THORACIC SURGERY (CARDIOTHORACIC VASCULAR SURGERY)

## 2024-10-22 PROCEDURE — 93010 ELECTROCARDIOGRAM REPORT: CPT | Performed by: INTERNAL MEDICINE

## 2024-10-22 PROCEDURE — 93005 ELECTROCARDIOGRAM TRACING: CPT

## 2024-10-22 NOTE — DISCHARGE INSTRUCTIONS
CHLORHEXIDINE CLOTH INSTRUCTIONS  The morning of surgery follow these instructions using the Chlorhexidine cloths you've been given.  These steps reduce bacteria on the body.  Do not use the cloths near your eyes, ears mouth, genitalia or on open wounds.  Throw the cloths away after use but do not try to flush them down a toilet.      Open and remove one cloth at a time from the package.    Leave the cloth unfolded and begin the bathing.  Massage the skin with the cloths using gentle pressure to remove bacteria.  Do not scrub harshly.   Follow the steps below with one 2% CHG cloth per area (6 total cloths).  One cloth for neck, shoulders and chest.  One cloth for both arms, hands, fingers and underarms (do underarms last).  One cloth for the abdomen followed by groin.  One cloth for right leg and foot including between the toes.  One cloth for left leg and foot including between the toes.  The last cloth is to be used for the back of the neck, back and buttocks.    Allow the CHG to air dry 3 minutes on the skin which will give it time to work and decrease the chance of irritation.  The skin may feel sticky until it is dry.  Do not rinse with water or any other liquid or you will lose the beneficial effects of the CHG.  If mild skin irritation occurs, do rinse the skin to remove the CHG.  Report this to the nurse at time of admission.  Do not apply lotions, creams, ointments, deodorants or perfumes after using the clothes. Dress in clean clothes before coming to the hospital.    Take the following medications the morning of surgery:  ESOMEPRAZOLE, NADOLOL, AND INHALER       If you are on prescription narcotic pain medication to control your pain you may also take that medication the morning of surgery.      General Instructions:     Do not eat solid food after midnight the night before surgery.  Clear liquids day of surgery are allowed but must be stopped at least two hours before your hospital arrival time.        Allowed clear liquids      Water, sodas, and tea or coffee with no cream or milk added.       12 to 20 ounces of a clear liquid that contains carbohydrates is recommended.  If non-diabetic, have Gatorade or Powerade.  If diabetic, have G2 or Powerade Zero.     Do not have liquids red in color.  Do not consume chicken, beef, pork or vegetable broth or bouillon cubes of any variety as they are not considered clear liquids and are not allowed.      Infants may have breast milk up to four hours before surgery.  Infants drinking formula may drink formula up to six hours before surgery.   Patients who avoid smoking, chewing tobacco and alcohol for 4 weeks prior to surgery have a reduced risk of post-operative complications.  Quit smoking as many days before surgery as you can.  Do not smoke, use chewing tobacco or drink alcohol the day of surgery.   If applicable bring your C-PAP/ BI-PAP machine in with you to preop day of surgery.  Bring any papers given to you in the doctor’s office.  Wear clean comfortable clothes.  Do not wear contact lenses, false eyelashes or make-up.  Bring a case for your glasses.   Bring crutches or walker if applicable.  Remove all piercings.  Leave jewelry and any other valuables at home.  Hair extensions with metal clips must be removed prior to surgery.  The Pre-Admission Testing nurse will instruct you to bring medications if unable to obtain an accurate list in Pre-Admission Testing.        If you were given a blood bank ID arm band remember to bring it with you the day of surgery.    Preventing a Surgical Site Infection:  For 2 to 3 days before surgery, avoid shaving with a razor because the razor can irritate skin and make it easier to develop an infection.    Any areas of open skin can increase the risk of a post-operative wound infection by allowing bacteria to enter and travel throughout the body.  Notify your surgeon if you have any skin wounds / rashes even if it is not near the  expected surgical site.  The area will need assessed to determine if surgery should be delayed until it is healed.  The night prior to surgery shower using a fresh bar of anti-bacterial soap (such as Dial) and clean washcloth.  Sleep in a clean bed with clean clothing.  Do not allow pets to sleep with you.  Shower on the morning of surgery using a fresh bar of anti-bacterial soap (such as Dial) and clean washcloth.  Dry with a clean towel and dress in clean clothing.  Ask your surgeon if you will be receiving antibiotics prior to surgery.  Make sure you, your family, and all healthcare providers clean their hands with soap and water or an alcohol based hand  before caring for you or your wound.    Day of surgery:  Your arrival time is approximately two hours before your scheduled surgery time.  Please note if you have an early arrival time the surgery doors do not open before 5:00 AM.  Upon arrival, a Pre-op nurse and Anesthesiologist will review your health history, obtain vital signs, and answer questions you may have.  The only belongings needed at this time will be a list of your home medications and if applicable your C-PAP/BI-PAP machine.  A Pre-op nurse will start an IV and you may receive medication in preparation for surgery, including something to help you relax.     Please be aware that surgery does come with discomfort.  We want to make every effort to control your discomfort so please discuss any uncontrolled symptoms with your nurse.   Your doctor will most likely have prescribed pain medications.      If you are going home after surgery you will receive individualized written care instructions before being discharged.  A responsible adult must drive you to and from the hospital on the day of your surgery and ideally stay with you through the night.   .  Discharge prescriptions can be filled by the hospital pharmacy during regular pharmacy hours.  If you are having surgery late in the  day/evening your prescription may be e-prescribed to your pharmacy.  Please verify your pharmacy hours or chose a 24 hour pharmacy to avoid not having access to your prescription because your pharmacy has closed for the day.    If you are staying overnight following surgery, you will be transported to your hospital room following the recovery period.  [facility] has all private rooms.    If you have any questions please call Pre-Admission Testing at [phone].  Deductibles and co-payments are collected on the day of service. Please be prepared to pay the required co-pay, deductible or deposit on the day of service as defined by your plan.    Call your surgeon immediately if you experience any of the following symptoms:  Sore Throat  Shortness of Breath or difficulty breathing  Cough  Chills  Body soreness or muscle pain  Headache  Fever  New loss of taste or smell  Do not arrive for your surgery ill.  Your procedure will need to be rescheduled to another time.  You will need to call your physician before the day of surgery to avoid any unnecessary exposure to hospital staff as well as other patients.

## 2024-10-24 ENCOUNTER — ANESTHESIA EVENT (OUTPATIENT)
Dept: PERIOP | Facility: HOSPITAL | Age: 54
End: 2024-10-24
Payer: COMMERCIAL

## 2024-10-25 ENCOUNTER — HOSPITAL ENCOUNTER (INPATIENT)
Facility: HOSPITAL | Age: 54
LOS: 2 days | Discharge: HOME | DRG: 165 | End: 2024-10-27
Attending: THORACIC SURGERY (CARDIOTHORACIC VASCULAR SURGERY) | Admitting: THORACIC SURGERY (CARDIOTHORACIC VASCULAR SURGERY)
Payer: COMMERCIAL

## 2024-10-25 ENCOUNTER — ANESTHESIA (OUTPATIENT)
Dept: PERIOP | Facility: HOSPITAL | Age: 54
End: 2024-10-25
Payer: COMMERCIAL

## 2024-10-25 ENCOUNTER — APPOINTMENT (OUTPATIENT)
Dept: GENERAL RADIOLOGY | Facility: HOSPITAL | Age: 54
DRG: 165 | End: 2024-10-25
Payer: COMMERCIAL

## 2024-10-25 DIAGNOSIS — R91.1 LUNG NODULE: ICD-10-CM

## 2024-10-25 LAB
GLUCOSE BLDC GLUCOMTR-MCNC: 254 MG/DL (ref 70–130)
GLUCOSE BLDC GLUCOMTR-MCNC: 283 MG/DL (ref 70–130)
GLUCOSE BLDC GLUCOMTR-MCNC: 365 MG/DL (ref 70–130)
GLUCOSE BLDC GLUCOMTR-MCNC: 377 MG/DL (ref 70–130)

## 2024-10-25 PROCEDURE — 0BJ08ZZ INSPECTION OF TRACHEOBRONCHIAL TREE, VIA NATURAL OR ARTIFICIAL OPENING ENDOSCOPIC: ICD-10-PCS | Performed by: THORACIC SURGERY (CARDIOTHORACIC VASCULAR SURGERY)

## 2024-10-25 PROCEDURE — 25010000002 HYDROMORPHONE PER 4 MG: Performed by: THORACIC SURGERY (CARDIOTHORACIC VASCULAR SURGERY)

## 2024-10-25 PROCEDURE — 63710000001 INSULIN LISPRO (HUMAN) PER 5 UNITS: Performed by: THORACIC SURGERY (CARDIOTHORACIC VASCULAR SURGERY)

## 2024-10-25 PROCEDURE — 88341 IMHCHEM/IMCYTCHM EA ADD ANTB: CPT | Performed by: THORACIC SURGERY (CARDIOTHORACIC VASCULAR SURGERY)

## 2024-10-25 PROCEDURE — 25010000002 FENTANYL CITRATE (PF) 50 MCG/ML SOLUTION: Performed by: NURSE ANESTHETIST, CERTIFIED REGISTERED

## 2024-10-25 PROCEDURE — 25010000002 MAGNESIUM SULFATE PER 500 MG OF MAGNESIUM: Performed by: NURSE ANESTHETIST, CERTIFIED REGISTERED

## 2024-10-25 PROCEDURE — C9290 INJ, BUPIVACAINE LIPOSOME: HCPCS | Performed by: THORACIC SURGERY (CARDIOTHORACIC VASCULAR SURGERY)

## 2024-10-25 PROCEDURE — 07B74ZZ EXCISION OF THORAX LYMPHATIC, PERCUTANEOUS ENDOSCOPIC APPROACH: ICD-10-PCS | Performed by: THORACIC SURGERY (CARDIOTHORACIC VASCULAR SURGERY)

## 2024-10-25 PROCEDURE — 25810000003 LACTATED RINGERS PER 1000 ML: Performed by: ANESTHESIOLOGY

## 2024-10-25 PROCEDURE — 63710000001 INSULIN REGULAR HUMAN PER 5 UNITS: Performed by: ANESTHESIOLOGY

## 2024-10-25 PROCEDURE — 25010000002 FENTANYL CITRATE (PF) 50 MCG/ML SOLUTION: Performed by: ANESTHESIOLOGY

## 2024-10-25 PROCEDURE — 25010000002 MIDAZOLAM PER 1 MG: Performed by: ANESTHESIOLOGY

## 2024-10-25 PROCEDURE — 82948 REAGENT STRIP/BLOOD GLUCOSE: CPT

## 2024-10-25 PROCEDURE — 25010000002 BUPIVACAINE (PF) 0.25 % SOLUTION 10 ML VIAL: Performed by: THORACIC SURGERY (CARDIOTHORACIC VASCULAR SURGERY)

## 2024-10-25 PROCEDURE — 32674 THORACOSCOPY LYMPH NODE EXC: CPT | Performed by: THORACIC SURGERY (CARDIOTHORACIC VASCULAR SURGERY)

## 2024-10-25 PROCEDURE — 88331 PATH CONSLTJ SURG 1 BLK 1SPC: CPT | Performed by: THORACIC SURGERY (CARDIOTHORACIC VASCULAR SURGERY)

## 2024-10-25 PROCEDURE — 82947 ASSAY GLUCOSE BLOOD QUANT: CPT

## 2024-10-25 PROCEDURE — 94640 AIRWAY INHALATION TREATMENT: CPT

## 2024-10-25 PROCEDURE — 0 BUPIVACAINE LIPOSOME 1.3 % SUSPENSION 10 ML VIAL: Performed by: THORACIC SURGERY (CARDIOTHORACIC VASCULAR SURGERY)

## 2024-10-25 PROCEDURE — 25010000002 HEPARIN (PORCINE) PER 1000 UNITS: Performed by: THORACIC SURGERY (CARDIOTHORACIC VASCULAR SURGERY)

## 2024-10-25 PROCEDURE — 85014 HEMATOCRIT: CPT

## 2024-10-25 PROCEDURE — 32608 THORACOSCOPY W/BX NODULE: CPT | Performed by: THORACIC SURGERY (CARDIOTHORACIC VASCULAR SURGERY)

## 2024-10-25 PROCEDURE — S0260 H&P FOR SURGERY: HCPCS | Performed by: THORACIC SURGERY (CARDIOTHORACIC VASCULAR SURGERY)

## 2024-10-25 PROCEDURE — 94799 UNLISTED PULMONARY SVC/PX: CPT

## 2024-10-25 PROCEDURE — 63710000001 INSULIN GLARGINE PER 5 UNITS: Performed by: THORACIC SURGERY (CARDIOTHORACIC VASCULAR SURGERY)

## 2024-10-25 PROCEDURE — 88305 TISSUE EXAM BY PATHOLOGIST: CPT | Performed by: THORACIC SURGERY (CARDIOTHORACIC VASCULAR SURGERY)

## 2024-10-25 PROCEDURE — C9290 INJ, BUPIVACAINE LIPOSOME: HCPCS | Performed by: ANESTHESIOLOGY

## 2024-10-25 PROCEDURE — 25010000002 ONDANSETRON PER 1 MG: Performed by: NURSE ANESTHETIST, CERTIFIED REGISTERED

## 2024-10-25 PROCEDURE — 88360 TUMOR IMMUNOHISTOCHEM/MANUAL: CPT | Performed by: THORACIC SURGERY (CARDIOTHORACIC VASCULAR SURGERY)

## 2024-10-25 PROCEDURE — 25010000002 LIDOCAINE 2% SOLUTION: Performed by: NURSE ANESTHETIST, CERTIFIED REGISTERED

## 2024-10-25 PROCEDURE — C1729 CATH, DRAINAGE: HCPCS | Performed by: THORACIC SURGERY (CARDIOTHORACIC VASCULAR SURGERY)

## 2024-10-25 PROCEDURE — 3E0T3BZ INTRODUCTION OF ANESTHETIC AGENT INTO PERIPHERAL NERVES AND PLEXI, PERCUTANEOUS APPROACH: ICD-10-PCS | Performed by: THORACIC SURGERY (CARDIOTHORACIC VASCULAR SURGERY)

## 2024-10-25 PROCEDURE — 88342 IMHCHEM/IMCYTCHM 1ST ANTB: CPT | Performed by: THORACIC SURGERY (CARDIOTHORACIC VASCULAR SURGERY)

## 2024-10-25 PROCEDURE — 8E0W4CZ ROBOTIC ASSISTED PROCEDURE OF TRUNK REGION, PERCUTANEOUS ENDOSCOPIC APPROACH: ICD-10-PCS | Performed by: THORACIC SURGERY (CARDIOTHORACIC VASCULAR SURGERY)

## 2024-10-25 PROCEDURE — 25010000002 SUGAMMADEX 200 MG/2ML SOLUTION: Performed by: NURSE ANESTHETIST, CERTIFIED REGISTERED

## 2024-10-25 PROCEDURE — 71045 X-RAY EXAM CHEST 1 VIEW: CPT

## 2024-10-25 PROCEDURE — 82803 BLOOD GASES ANY COMBINATION: CPT

## 2024-10-25 PROCEDURE — 85018 HEMOGLOBIN: CPT

## 2024-10-25 PROCEDURE — 0 BUPIVACAINE LIPOSOME 1.3 % SUSPENSION: Performed by: ANESTHESIOLOGY

## 2024-10-25 PROCEDURE — 25010000002 HYDROMORPHONE 1 MG/ML SOLUTION: Performed by: NURSE ANESTHETIST, CERTIFIED REGISTERED

## 2024-10-25 PROCEDURE — 94761 N-INVAS EAR/PLS OXIMETRY MLT: CPT

## 2024-10-25 PROCEDURE — 25010000002 ONDANSETRON PER 1 MG: Performed by: THORACIC SURGERY (CARDIOTHORACIC VASCULAR SURGERY)

## 2024-10-25 PROCEDURE — 25010000002 CEFAZOLIN PER 500 MG: Performed by: THORACIC SURGERY (CARDIOTHORACIC VASCULAR SURGERY)

## 2024-10-25 PROCEDURE — C1894 INTRO/SHEATH, NON-LASER: HCPCS | Performed by: THORACIC SURGERY (CARDIOTHORACIC VASCULAR SURGERY)

## 2024-10-25 PROCEDURE — 88307 TISSUE EXAM BY PATHOLOGIST: CPT | Performed by: THORACIC SURGERY (CARDIOTHORACIC VASCULAR SURGERY)

## 2024-10-25 PROCEDURE — 25010000002 PROPOFOL 10 MG/ML EMULSION: Performed by: NURSE ANESTHETIST, CERTIFIED REGISTERED

## 2024-10-25 PROCEDURE — 0BBC4ZZ EXCISION OF RIGHT UPPER LUNG LOBE, PERCUTANEOUS ENDOSCOPIC APPROACH: ICD-10-PCS | Performed by: THORACIC SURGERY (CARDIOTHORACIC VASCULAR SURGERY)

## 2024-10-25 PROCEDURE — 25810000003 SODIUM CHLORIDE PER 500 ML: Performed by: THORACIC SURGERY (CARDIOTHORACIC VASCULAR SURGERY)

## 2024-10-25 PROCEDURE — 25010000002 MIDAZOLAM PER 1 MG: Performed by: NURSE ANESTHETIST, CERTIFIED REGISTERED

## 2024-10-25 PROCEDURE — 25010000002 HYDROMORPHONE PER 4 MG: Performed by: NURSE ANESTHETIST, CERTIFIED REGISTERED

## 2024-10-25 DEVICE — SUREFORM 45 RELOAD GREEN
Type: IMPLANTABLE DEVICE | Site: CHEST | Status: FUNCTIONAL
Brand: SUREFORM

## 2024-10-25 RX ORDER — SODIUM CHLORIDE 0.9 % (FLUSH) 0.9 %
3-10 SYRINGE (ML) INJECTION AS NEEDED
Status: DISCONTINUED | OUTPATIENT
Start: 2024-10-25 | End: 2024-10-25 | Stop reason: HOSPADM

## 2024-10-25 RX ORDER — IPRATROPIUM BROMIDE AND ALBUTEROL SULFATE 2.5; .5 MG/3ML; MG/3ML
3 SOLUTION RESPIRATORY (INHALATION) ONCE AS NEEDED
Status: DISCONTINUED | OUTPATIENT
Start: 2024-10-25 | End: 2024-10-25 | Stop reason: HOSPADM

## 2024-10-25 RX ORDER — LIDOCAINE HYDROCHLORIDE 20 MG/ML
INJECTION, SOLUTION INFILTRATION; PERINEURAL AS NEEDED
Status: DISCONTINUED | OUTPATIENT
Start: 2024-10-25 | End: 2024-10-25 | Stop reason: SURG

## 2024-10-25 RX ORDER — HYDRALAZINE HYDROCHLORIDE 20 MG/ML
5 INJECTION INTRAMUSCULAR; INTRAVENOUS
Status: DISCONTINUED | OUTPATIENT
Start: 2024-10-25 | End: 2024-10-25 | Stop reason: HOSPADM

## 2024-10-25 RX ORDER — PANTOPRAZOLE SODIUM 40 MG/1
40 TABLET, DELAYED RELEASE ORAL
Status: DISCONTINUED | OUTPATIENT
Start: 2024-10-26 | End: 2024-10-27 | Stop reason: HOSPADM

## 2024-10-25 RX ORDER — IPRATROPIUM BROMIDE AND ALBUTEROL SULFATE 2.5; .5 MG/3ML; MG/3ML
3 SOLUTION RESPIRATORY (INHALATION)
Status: DISCONTINUED | OUTPATIENT
Start: 2024-10-25 | End: 2024-10-27 | Stop reason: HOSPADM

## 2024-10-25 RX ORDER — LIDOCAINE HYDROCHLORIDE 10 MG/ML
0.5 INJECTION, SOLUTION INFILTRATION; PERINEURAL ONCE AS NEEDED
Status: DISCONTINUED | OUTPATIENT
Start: 2024-10-25 | End: 2024-10-25 | Stop reason: HOSPADM

## 2024-10-25 RX ORDER — HYDROMORPHONE HYDROCHLORIDE 1 MG/ML
0.25 INJECTION, SOLUTION INTRAMUSCULAR; INTRAVENOUS; SUBCUTANEOUS
Status: DISCONTINUED | OUTPATIENT
Start: 2024-10-25 | End: 2024-10-25 | Stop reason: HOSPADM

## 2024-10-25 RX ORDER — FAMOTIDINE 10 MG/ML
20 INJECTION, SOLUTION INTRAVENOUS ONCE
Status: COMPLETED | OUTPATIENT
Start: 2024-10-25 | End: 2024-10-25

## 2024-10-25 RX ORDER — DEXTROSE MONOHYDRATE 25 G/50ML
10-50 INJECTION, SOLUTION INTRAVENOUS
Status: DISCONTINUED | OUTPATIENT
Start: 2024-10-25 | End: 2024-10-26

## 2024-10-25 RX ORDER — FENTANYL CITRATE 50 UG/ML
INJECTION, SOLUTION INTRAMUSCULAR; INTRAVENOUS AS NEEDED
Status: DISCONTINUED | OUTPATIENT
Start: 2024-10-25 | End: 2024-10-25 | Stop reason: SURG

## 2024-10-25 RX ORDER — SODIUM CHLORIDE, SODIUM LACTATE, POTASSIUM CHLORIDE, CALCIUM CHLORIDE 600; 310; 30; 20 MG/100ML; MG/100ML; MG/100ML; MG/100ML
9 INJECTION, SOLUTION INTRAVENOUS CONTINUOUS
Status: ACTIVE | OUTPATIENT
Start: 2024-10-25 | End: 2024-10-26

## 2024-10-25 RX ORDER — DEXTROSE MONOHYDRATE, SODIUM CHLORIDE, AND POTASSIUM CHLORIDE 50; 1.49; 4.5 G/1000ML; G/1000ML; G/1000ML
75 INJECTION, SOLUTION INTRAVENOUS CONTINUOUS
Status: DISCONTINUED | OUTPATIENT
Start: 2024-10-25 | End: 2024-10-25

## 2024-10-25 RX ORDER — FLUMAZENIL 0.1 MG/ML
0.2 INJECTION INTRAVENOUS AS NEEDED
Status: DISCONTINUED | OUTPATIENT
Start: 2024-10-25 | End: 2024-10-25 | Stop reason: HOSPADM

## 2024-10-25 RX ORDER — HEPARIN SODIUM 5000 [USP'U]/ML
5000 INJECTION, SOLUTION INTRAVENOUS; SUBCUTANEOUS EVERY 8 HOURS SCHEDULED
Status: DISCONTINUED | OUTPATIENT
Start: 2024-10-26 | End: 2024-10-27 | Stop reason: HOSPADM

## 2024-10-25 RX ORDER — DROPERIDOL 2.5 MG/ML
0.62 INJECTION, SOLUTION INTRAMUSCULAR; INTRAVENOUS
Status: DISCONTINUED | OUTPATIENT
Start: 2024-10-25 | End: 2024-10-25 | Stop reason: HOSPADM

## 2024-10-25 RX ORDER — PHENYLEPHRINE HCL IN 0.9% NACL 1 MG/10 ML
SYRINGE (ML) INTRAVENOUS AS NEEDED
Status: DISCONTINUED | OUTPATIENT
Start: 2024-10-25 | End: 2024-10-25 | Stop reason: SURG

## 2024-10-25 RX ORDER — NITROGLYCERIN 0.4 MG/1
0.4 TABLET SUBLINGUAL
Status: DISCONTINUED | OUTPATIENT
Start: 2024-10-25 | End: 2024-10-27 | Stop reason: HOSPADM

## 2024-10-25 RX ORDER — ONDANSETRON 4 MG/1
4 TABLET, ORALLY DISINTEGRATING ORAL EVERY 6 HOURS PRN
Status: DISCONTINUED | OUTPATIENT
Start: 2024-10-25 | End: 2024-10-27 | Stop reason: HOSPADM

## 2024-10-25 RX ORDER — DIPHENHYDRAMINE HCL 25 MG
25 CAPSULE ORAL EVERY 4 HOURS PRN
Status: DISCONTINUED | OUTPATIENT
Start: 2024-10-25 | End: 2024-10-25 | Stop reason: HOSPADM

## 2024-10-25 RX ORDER — SODIUM CHLORIDE 9 MG/ML
INJECTION, SOLUTION INTRAVENOUS AS NEEDED
Status: DISCONTINUED | OUTPATIENT
Start: 2024-10-25 | End: 2024-10-25 | Stop reason: HOSPADM

## 2024-10-25 RX ORDER — FENTANYL CITRATE 50 UG/ML
INJECTION, SOLUTION INTRAMUSCULAR; INTRAVENOUS
Status: COMPLETED | OUTPATIENT
Start: 2024-10-25 | End: 2024-10-25

## 2024-10-25 RX ORDER — NALOXONE HCL 0.4 MG/ML
0.4 VIAL (ML) INJECTION
Status: DISCONTINUED | OUTPATIENT
Start: 2024-10-25 | End: 2024-10-25 | Stop reason: HOSPADM

## 2024-10-25 RX ORDER — SODIUM CHLORIDE 0.9 % (FLUSH) 0.9 %
10 SYRINGE (ML) INJECTION EVERY 12 HOURS SCHEDULED
Status: CANCELLED | OUTPATIENT
Start: 2024-10-25

## 2024-10-25 RX ORDER — OXYCODONE HYDROCHLORIDE 5 MG/1
5 TABLET ORAL ONCE AS NEEDED
Status: DISCONTINUED | OUTPATIENT
Start: 2024-10-25 | End: 2024-10-25 | Stop reason: HOSPADM

## 2024-10-25 RX ORDER — ACETAMINOPHEN 500 MG
1000 TABLET ORAL EVERY 8 HOURS
Status: DISCONTINUED | OUTPATIENT
Start: 2024-10-25 | End: 2024-10-27 | Stop reason: HOSPADM

## 2024-10-25 RX ORDER — SODIUM CHLORIDE 0.9 % (FLUSH) 0.9 %
3 SYRINGE (ML) INJECTION EVERY 12 HOURS SCHEDULED
Status: DISCONTINUED | OUTPATIENT
Start: 2024-10-25 | End: 2024-10-25 | Stop reason: HOSPADM

## 2024-10-25 RX ORDER — ACETAMINOPHEN 500 MG
1000 TABLET ORAL ONCE
Status: COMPLETED | OUTPATIENT
Start: 2024-10-25 | End: 2024-10-25

## 2024-10-25 RX ORDER — MIDAZOLAM HYDROCHLORIDE 1 MG/ML
1 INJECTION, SOLUTION INTRAMUSCULAR; INTRAVENOUS
Status: DISCONTINUED | OUTPATIENT
Start: 2024-10-25 | End: 2024-10-25 | Stop reason: HOSPADM

## 2024-10-25 RX ORDER — SODIUM CHLORIDE 9 MG/ML
40 INJECTION, SOLUTION INTRAVENOUS AS NEEDED
Status: DISCONTINUED | OUTPATIENT
Start: 2024-10-25 | End: 2024-10-25 | Stop reason: HOSPADM

## 2024-10-25 RX ORDER — NICOTINE POLACRILEX 4 MG
15 LOZENGE BUCCAL
Status: DISCONTINUED | OUTPATIENT
Start: 2024-10-25 | End: 2024-10-26

## 2024-10-25 RX ORDER — DIPHENHYDRAMINE HYDROCHLORIDE 50 MG/ML
25 INJECTION INTRAMUSCULAR; INTRAVENOUS EVERY 4 HOURS PRN
Status: DISCONTINUED | OUTPATIENT
Start: 2024-10-25 | End: 2024-10-25 | Stop reason: HOSPADM

## 2024-10-25 RX ORDER — METOCLOPRAMIDE HYDROCHLORIDE 5 MG/ML
10 INJECTION INTRAMUSCULAR; INTRAVENOUS ONCE AS NEEDED
Status: DISCONTINUED | OUTPATIENT
Start: 2024-10-25 | End: 2024-10-25 | Stop reason: HOSPADM

## 2024-10-25 RX ORDER — BISACODYL 10 MG
10 SUPPOSITORY, RECTAL RECTAL DAILY PRN
Status: DISCONTINUED | OUTPATIENT
Start: 2024-10-25 | End: 2024-10-27 | Stop reason: HOSPADM

## 2024-10-25 RX ORDER — ONDANSETRON 2 MG/ML
4 INJECTION INTRAMUSCULAR; INTRAVENOUS EVERY 6 HOURS PRN
Status: DISCONTINUED | OUTPATIENT
Start: 2024-10-25 | End: 2024-10-27 | Stop reason: HOSPADM

## 2024-10-25 RX ORDER — EPHEDRINE SULFATE 50 MG/ML
INJECTION INTRAVENOUS AS NEEDED
Status: DISCONTINUED | OUTPATIENT
Start: 2024-10-25 | End: 2024-10-25 | Stop reason: SURG

## 2024-10-25 RX ORDER — BUPIVACAINE HYDROCHLORIDE AND EPINEPHRINE 2.5; 5 MG/ML; UG/ML
INJECTION, SOLUTION EPIDURAL; INFILTRATION; INTRACAUDAL; PERINEURAL
Status: COMPLETED
Start: 2024-10-25 | End: 2024-10-25

## 2024-10-25 RX ORDER — ONDANSETRON 2 MG/ML
INJECTION INTRAMUSCULAR; INTRAVENOUS AS NEEDED
Status: DISCONTINUED | OUTPATIENT
Start: 2024-10-25 | End: 2024-10-25 | Stop reason: SURG

## 2024-10-25 RX ORDER — HYDROMORPHONE HYDROCHLORIDE 1 MG/ML
0.5 INJECTION, SOLUTION INTRAMUSCULAR; INTRAVENOUS; SUBCUTANEOUS
Status: DISCONTINUED | OUTPATIENT
Start: 2024-10-25 | End: 2024-10-27 | Stop reason: HOSPADM

## 2024-10-25 RX ORDER — ONDANSETRON 2 MG/ML
4 INJECTION INTRAMUSCULAR; INTRAVENOUS ONCE AS NEEDED
Status: COMPLETED | OUTPATIENT
Start: 2024-10-25 | End: 2024-10-25

## 2024-10-25 RX ORDER — LABETALOL HYDROCHLORIDE 5 MG/ML
5 INJECTION, SOLUTION INTRAVENOUS
Status: DISCONTINUED | OUTPATIENT
Start: 2024-10-25 | End: 2024-10-25 | Stop reason: HOSPADM

## 2024-10-25 RX ORDER — OXYCODONE HYDROCHLORIDE 5 MG/1
5 TABLET ORAL EVERY 4 HOURS PRN
Status: DISCONTINUED | OUTPATIENT
Start: 2024-10-25 | End: 2024-10-27 | Stop reason: HOSPADM

## 2024-10-25 RX ORDER — ALBUTEROL SULFATE 90 UG/1
2 INHALANT RESPIRATORY (INHALATION) EVERY 4 HOURS PRN
Status: DISCONTINUED | OUTPATIENT
Start: 2024-10-25 | End: 2024-10-27 | Stop reason: HOSPADM

## 2024-10-25 RX ORDER — SODIUM CHLORIDE 9 MG/ML
40 INJECTION, SOLUTION INTRAVENOUS AS NEEDED
Status: CANCELLED | OUTPATIENT
Start: 2024-10-25

## 2024-10-25 RX ORDER — FENTANYL CITRATE 50 UG/ML
50 INJECTION, SOLUTION INTRAMUSCULAR; INTRAVENOUS ONCE AS NEEDED
Status: DISCONTINUED | OUTPATIENT
Start: 2024-10-25 | End: 2024-10-25 | Stop reason: HOSPADM

## 2024-10-25 RX ORDER — AMOXICILLIN 250 MG
2 CAPSULE ORAL NIGHTLY
Status: DISCONTINUED | OUTPATIENT
Start: 2024-10-25 | End: 2024-10-27 | Stop reason: HOSPADM

## 2024-10-25 RX ORDER — MIDAZOLAM HYDROCHLORIDE 1 MG/ML
INJECTION, SOLUTION INTRAMUSCULAR; INTRAVENOUS
Status: COMPLETED | OUTPATIENT
Start: 2024-10-25 | End: 2024-10-25

## 2024-10-25 RX ORDER — MIDAZOLAM HYDROCHLORIDE 1 MG/ML
INJECTION INTRAMUSCULAR; INTRAVENOUS AS NEEDED
Status: DISCONTINUED | OUTPATIENT
Start: 2024-10-25 | End: 2024-10-25 | Stop reason: SURG

## 2024-10-25 RX ORDER — PROPOFOL 10 MG/ML
VIAL (ML) INTRAVENOUS AS NEEDED
Status: DISCONTINUED | OUTPATIENT
Start: 2024-10-25 | End: 2024-10-25 | Stop reason: SURG

## 2024-10-25 RX ORDER — ROCURONIUM BROMIDE 10 MG/ML
INJECTION, SOLUTION INTRAVENOUS AS NEEDED
Status: DISCONTINUED | OUTPATIENT
Start: 2024-10-25 | End: 2024-10-25 | Stop reason: SURG

## 2024-10-25 RX ORDER — IBUPROFEN 600 MG/1
1 TABLET ORAL
Status: DISCONTINUED | OUTPATIENT
Start: 2024-10-25 | End: 2024-10-26

## 2024-10-25 RX ORDER — INSULIN LISPRO 100 [IU]/ML
1-200 INJECTION, SOLUTION INTRAVENOUS; SUBCUTANEOUS AS NEEDED
Status: DISCONTINUED | OUTPATIENT
Start: 2024-10-25 | End: 2024-10-26

## 2024-10-25 RX ORDER — NITROGLYCERIN 0.4 MG/1
0.4 TABLET SUBLINGUAL
Status: DISCONTINUED | OUTPATIENT
Start: 2024-10-25 | End: 2024-10-25 | Stop reason: SDUPTHER

## 2024-10-25 RX ORDER — INSULIN LISPRO 100 [IU]/ML
1-200 INJECTION, SOLUTION INTRAVENOUS; SUBCUTANEOUS
Status: DISCONTINUED | OUTPATIENT
Start: 2024-10-25 | End: 2024-10-26

## 2024-10-25 RX ORDER — NALOXONE HCL 0.4 MG/ML
0.4 VIAL (ML) INJECTION AS NEEDED
Status: DISCONTINUED | OUTPATIENT
Start: 2024-10-25 | End: 2024-10-25 | Stop reason: HOSPADM

## 2024-10-25 RX ORDER — LUBIPROSTONE 8 UG/1
8 CAPSULE ORAL 2 TIMES DAILY
Status: DISCONTINUED | OUTPATIENT
Start: 2024-10-25 | End: 2024-10-27 | Stop reason: HOSPADM

## 2024-10-25 RX ORDER — BUPIVACAINE HYDROCHLORIDE AND EPINEPHRINE 2.5; 5 MG/ML; UG/ML
INJECTION, SOLUTION EPIDURAL; INFILTRATION; INTRACAUDAL; PERINEURAL
Status: COMPLETED | OUTPATIENT
Start: 2024-10-25 | End: 2024-10-25

## 2024-10-25 RX ORDER — SODIUM CHLORIDE 0.9 % (FLUSH) 0.9 %
10 SYRINGE (ML) INJECTION AS NEEDED
Status: CANCELLED | OUTPATIENT
Start: 2024-10-25

## 2024-10-25 RX ORDER — GABAPENTIN 300 MG/1
600 CAPSULE ORAL ONCE
Status: DISCONTINUED | OUTPATIENT
Start: 2024-10-25 | End: 2024-10-25 | Stop reason: HOSPADM

## 2024-10-25 RX ORDER — METOPROLOL TARTRATE 25 MG/1
25 TABLET, FILM COATED ORAL EVERY 12 HOURS SCHEDULED
Status: DISCONTINUED | OUTPATIENT
Start: 2024-10-25 | End: 2024-10-27 | Stop reason: HOSPADM

## 2024-10-25 RX ORDER — HEPARIN SODIUM 5000 [USP'U]/ML
5000 INJECTION, SOLUTION INTRAVENOUS; SUBCUTANEOUS ONCE
Status: COMPLETED | OUTPATIENT
Start: 2024-10-25 | End: 2024-10-25

## 2024-10-25 RX ORDER — MIDAZOLAM HYDROCHLORIDE 1 MG/ML
1 INJECTION, SOLUTION INTRAMUSCULAR; INTRAVENOUS
Status: CANCELLED | OUTPATIENT
Start: 2024-10-25

## 2024-10-25 RX ORDER — MAGNESIUM SULFATE HEPTAHYDRATE 500 MG/ML
INJECTION, SOLUTION INTRAMUSCULAR; INTRAVENOUS AS NEEDED
Status: DISCONTINUED | OUTPATIENT
Start: 2024-10-25 | End: 2024-10-25 | Stop reason: SURG

## 2024-10-25 RX ADMIN — Medication 10 MG: at 16:31

## 2024-10-25 RX ADMIN — Medication 10 MG: at 17:17

## 2024-10-25 RX ADMIN — LIDOCAINE HYDROCHLORIDE 60 MG: 20 INJECTION, SOLUTION INFILTRATION; PERINEURAL at 11:56

## 2024-10-25 RX ADMIN — MIDAZOLAM 1 MG: 1 INJECTION INTRAMUSCULAR; INTRAVENOUS at 09:39

## 2024-10-25 RX ADMIN — ACETAMINOPHEN 1000 MG: 500 TABLET ORAL at 10:05

## 2024-10-25 RX ADMIN — SODIUM CHLORIDE, POTASSIUM CHLORIDE, SODIUM LACTATE AND CALCIUM CHLORIDE 9 ML/HR: 600; 310; 30; 20 INJECTION, SOLUTION INTRAVENOUS at 09:36

## 2024-10-25 RX ADMIN — SODIUM CHLORIDE, POTASSIUM CHLORIDE, SODIUM LACTATE AND CALCIUM CHLORIDE: 600; 310; 30; 20 INJECTION, SOLUTION INTRAVENOUS at 13:53

## 2024-10-25 RX ADMIN — HYDROMORPHONE HYDROCHLORIDE 0.25 MG: 1 INJECTION, SOLUTION INTRAMUSCULAR; INTRAVENOUS; SUBCUTANEOUS at 16:12

## 2024-10-25 RX ADMIN — FENTANYL CITRATE 50 MCG: 50 INJECTION, SOLUTION INTRAMUSCULAR; INTRAVENOUS at 12:15

## 2024-10-25 RX ADMIN — INSULIN GLARGINE 17 UNITS: 100 INJECTION, SOLUTION SUBCUTANEOUS at 21:50

## 2024-10-25 RX ADMIN — BUPIVACAINE HYDROCHLORIDE AND EPINEPHRINE BITARTRATE 10 ML: 2.5; .005 INJECTION, SOLUTION EPIDURAL; INFILTRATION; INTRACAUDAL; PERINEURAL at 09:42

## 2024-10-25 RX ADMIN — FAMOTIDINE 20 MG: 10 INJECTION INTRAVENOUS at 09:36

## 2024-10-25 RX ADMIN — MIDAZOLAM 1 MG: 1 INJECTION INTRAMUSCULAR; INTRAVENOUS at 13:52

## 2024-10-25 RX ADMIN — Medication 100 MCG: at 12:30

## 2024-10-25 RX ADMIN — Medication 20 MG: at 13:10

## 2024-10-25 RX ADMIN — Medication 10 MG: at 14:40

## 2024-10-25 RX ADMIN — Medication 10 MG: at 17:41

## 2024-10-25 RX ADMIN — HYDROMORPHONE HYDROCHLORIDE 0.25 MG: 1 INJECTION, SOLUTION INTRAMUSCULAR; INTRAVENOUS; SUBCUTANEOUS at 15:39

## 2024-10-25 RX ADMIN — SUGAMMADEX 200 MG: 100 INJECTION, SOLUTION INTRAVENOUS at 13:38

## 2024-10-25 RX ADMIN — ONDANSETRON 4 MG: 2 INJECTION, SOLUTION INTRAMUSCULAR; INTRAVENOUS at 14:23

## 2024-10-25 RX ADMIN — Medication 10 MG: at 15:56

## 2024-10-25 RX ADMIN — INSULIN HUMAN 5 UNITS: 100 INJECTION, SOLUTION PARENTERAL at 09:19

## 2024-10-25 RX ADMIN — ONDANSETRON 4 MG: 2 INJECTION INTRAMUSCULAR; INTRAVENOUS at 13:15

## 2024-10-25 RX ADMIN — Medication 30 MG: at 12:10

## 2024-10-25 RX ADMIN — HYDROMORPHONE HYDROCHLORIDE 0.25 MG: 1 INJECTION, SOLUTION INTRAMUSCULAR; INTRAVENOUS; SUBCUTANEOUS at 17:07

## 2024-10-25 RX ADMIN — INSULIN LISPRO 4 UNITS: 100 INJECTION, SOLUTION INTRAVENOUS; SUBCUTANEOUS at 21:50

## 2024-10-25 RX ADMIN — ONDANSETRON 4 MG: 2 INJECTION INTRAMUSCULAR; INTRAVENOUS at 21:34

## 2024-10-25 RX ADMIN — BUPIVACAINE 10 ML: 13.3 INJECTION, SUSPENSION, LIPOSOMAL INFILTRATION at 09:54

## 2024-10-25 RX ADMIN — HYDROMORPHONE HYDROCHLORIDE 0.25 MG: 1 INJECTION, SOLUTION INTRAMUSCULAR; INTRAVENOUS; SUBCUTANEOUS at 15:10

## 2024-10-25 RX ADMIN — PROPOFOL 50 MG: 10 INJECTION, EMULSION INTRAVENOUS at 12:01

## 2024-10-25 RX ADMIN — HYDROMORPHONE HYDROCHLORIDE 0.25 MG: 1 INJECTION, SOLUTION INTRAMUSCULAR; INTRAVENOUS; SUBCUTANEOUS at 14:15

## 2024-10-25 RX ADMIN — HEPARIN SODIUM 5000 UNITS: 5000 INJECTION INTRAVENOUS; SUBCUTANEOUS at 10:08

## 2024-10-25 RX ADMIN — ROCURONIUM BROMIDE 50 MG: 10 INJECTION, SOLUTION INTRAVENOUS at 11:56

## 2024-10-25 RX ADMIN — IPRATROPIUM BROMIDE AND ALBUTEROL SULFATE 3 ML: .5; 3 SOLUTION RESPIRATORY (INHALATION) at 23:32

## 2024-10-25 RX ADMIN — POTASSIUM CHLORIDE, DEXTROSE MONOHYDRATE AND SODIUM CHLORIDE 75 ML/HR: 150; 5; 450 INJECTION, SOLUTION INTRAVENOUS at 19:23

## 2024-10-25 RX ADMIN — MAGNESIUM SULFATE HEPTAHYDRATE 1 G: 500 INJECTION, SOLUTION INTRAMUSCULAR; INTRAVENOUS at 13:10

## 2024-10-25 RX ADMIN — HYDROMORPHONE HYDROCHLORIDE 0.5 MG: 1 INJECTION, SOLUTION INTRAMUSCULAR; INTRAVENOUS; SUBCUTANEOUS at 21:34

## 2024-10-25 RX ADMIN — EPHEDRINE SULFATE 10 MG: 50 INJECTION INTRAVENOUS at 12:37

## 2024-10-25 RX ADMIN — SODIUM CHLORIDE 2000 MG: 900 INJECTION INTRAVENOUS at 11:38

## 2024-10-25 RX ADMIN — FENTANYL CITRATE 50 MCG: 50 INJECTION, SOLUTION INTRAMUSCULAR; INTRAVENOUS at 09:39

## 2024-10-25 RX ADMIN — HYDROMORPHONE HYDROCHLORIDE 0.25 MG: 1 INJECTION, SOLUTION INTRAMUSCULAR; INTRAVENOUS; SUBCUTANEOUS at 17:53

## 2024-10-25 RX ADMIN — MIDAZOLAM 1 MG: 1 INJECTION INTRAMUSCULAR; INTRAVENOUS at 13:50

## 2024-10-25 RX ADMIN — HYDROMORPHONE HYDROCHLORIDE 0.5 MG: 1 INJECTION, SOLUTION INTRAMUSCULAR; INTRAVENOUS; SUBCUTANEOUS at 13:51

## 2024-10-25 RX ADMIN — MAGNESIUM SULFATE HEPTAHYDRATE 1 G: 500 INJECTION, SOLUTION INTRAMUSCULAR; INTRAVENOUS at 12:10

## 2024-10-25 RX ADMIN — PROPOFOL 150 MG: 10 INJECTION, EMULSION INTRAVENOUS at 11:56

## 2024-10-25 NOTE — OP NOTE
THORACOSCOPY WITH LOBECTOMY WITH DAVINCI ROBOT  Procedure Report    Patient Name:  Pamela Hardy  YOB: 1970    Date of Surgery:  10/25/2024     Indications:  Right upper lobe NSCLC    Pre-op Diagnosis:   Lung nodule [R91.1]       Post-Op Diagnosis Codes:     * Lung nodule [R91.1]    Procedure/CPT® Codes:      Procedure(s):  BRONCHOSCOPY, RIGHT VIDEO ASSISTED THORACOSCOPY WITH RIGHT UPPER LOBE WEDGE RESECTION WITH DAVINCI ROBOT, MEDIASTINAL AND HILAR LYMPH NODE DISSECTION, INTERCOSTAL NERVE BLOCK    Staff:  Surgeon(s):  Kasandra Borrego MD    Assistant: Mariano Smith CSA was responsible for performing the following activities: Retraction, Suction, Closing, Placing Dressing, and Held/Positioned Camera and their skilled assistance was necessary for the success of this case.     Anesthesia: General with Block    Estimated Blood Loss: 25 mL    Implants:    Implant Name Type Inv. Item Serial No.  Lot No. LRB No. Used Action   RELOAD STPLR SUREFORM 45 DAVINCI/X/XI 6ROW 4.3 GRN 1P/U - [id number] Implant RELOAD STPLR SUREFORM 45 DAVINCI/X/XI 6ROW 4.3 GRN 1P/U  [employer] [id number] Right 2 Implanted   RELOAD STPLR SUREFORM 45 DAVINCI/X/XI 6ROW 4.3 GRN 1P/U - [id number] Implant RELOAD STPLR SUREFORM 45 DAVINCI/X/XI 6ROW 4.3 GRN 1P/U  [employer] [id number] Right 4 Implanted       Specimen:          Specimens       ID Source Type Tests Collected By Collected At Frozen?    A Lung, Right Upper Lobe Tissue TISSUE PATHOLOGY EXAM   Kasandra Borrego MD 10/25/24 1241 Yes    Description: RIGHT UPPER LOBE WEDGE RESECTION FOR FROZEN PLEASE CHECK MARGINS PLEASE CALL OR 23 WITH RESULTS     B Lymph Node Tissue TISSUE PATHOLOGY EXAM   Kasandra Borrego MD 10/25/24 1249 No    Description: LEVEL 8R LYMPH NODE FRESH FOR PERMANENT    C Lymph Node Tissue TISSUE PATHOLOGY EXAM   Kasandra Borrego MD 10/25/24 1249 No    Description: LEVEL 7 LYMPH NODE FRESH FOR PERMANENT    D Lymph Node Tissue  TISSUE PATHOLOGY EXAM   Kasandra Borrego MD 10/25/24 1253 No    Description: LEVEL 4R LYMPH NODE FRESH FOR PERMANENT    E Lymph Node Tissue TISSUE PATHOLOGY EXAM   Kasandra Borrego MD 10/25/24 1301 No    Description: LEVEL 10R LYMPH NODE FRESH FOR PERMANENT    F Lymph Node Tissue TISSUE PATHOLOGY EXAM   Kasandra Borrego MD 10/25/24 1307 No    Description: LEVEL 11R LYMPH NODE FRESH FOR PERMANENT              Findings: Frozen section with confirmation of adenocarcinoma with negative margins on wedge resection    Complications: None apparent    Synoptic portion:    Intent:  curative    Surgical procedure performed:  Excision or resection of less than one lobe, NOS - Wedge resection    Mediastinal lymph node stations resected:  4R Lower Paratracheal (right), 7 Subcarinal, and 8 Paraesophageal (below babatunde)    Hilar lymph node station(s) resected:  10 Hilar and 11 Interlobar        Description of Procedure: Pamela Hardy was identified in the preoperative holding area and again her consent for the procedure was verified.  The patient was transported to the operating room and placed on the operating room table in supine position.  A general anesthetic was successfully administered and She was intubated with a double lumen endotracheal tube without difficulty.  Placement of the double lumen was confirmed with a video bronchoscope examination. A time out was performed to verify correct patient, correct procedure and the correct administration of IV antibiotics per [facility] protocol.    The patient was placed in left lateral decubitus position, right side up and all pressure points were padded.  The patient was prepped and draped in standard sterile fashion.  Robotic trocars were placed in the seventh intercostal space in standard fashion.  Insufflation was begun.  The robot was brought onto the field and docked in standard fashion.  An assistant port was placed in the 10th intercostal space posterior axillary line.  Instruments  were passed into the chest cavity under direct vision.  My assistant remained at the bedside to manipulate and pass instruments and I proceeded to the robotic console.    The nodules identified in the right upper lobe and a generous wedge resection was performed with 6 sequential fires of a robotic tissue stapler.  The wedge was placed in an Endo Catch bag and removed from the chest cavity.  This was sent for frozen section which confirmed adenocarcinoma and the margins were negative.  The inferior pulmonary ligament was mobilized using bipolar cautery.  There was no significant inferior pulmonary ligament node.  The lung was directed anteriorly and the posterior attachments of the hilum were taken down with bipolar cautery.  The subcarinal space was opened and a subcarinal lymph node was retrieved.  A paraesophageal lymph node was retrieved.  The paratracheal space was opened and the azygos vein was mobilized.  A right paratracheal lymph node was retrieved.  I turned my attention to the fissure and the fissure was opened overlying the pulmonary artery.  Multiple lymph nodes were retrieved during this dissection.     A rib block was performed using Marcaine and Exparel.  A 24 Wolof chest tube was placed in the 8th intercostal space incision and the lung was reexpanded under direct visualization.     The incisions were closed using deep vicryl sutures and Monocryl for the skin in standard fashion.  The chest tube was secured to the skin.      The patient tolerated this procedure well and was extubated and transported to the recovery room in stable condition.

## 2024-10-25 NOTE — ANESTHESIA PREPROCEDURE EVALUATION
Anesthesia Evaluation     Patient summary reviewed and Nursing notes reviewed   history of anesthetic complications:   NPO Solid Status: > 8 hours  NPO Liquid Status: > 2 hours           Airway   Mallampati: II  TM distance: >3 FB  Dental - normal exam         Pulmonary    (+) a smoker Current, Smoked day of surgery, cigarettes, lung cancer, asthma,shortness of breath  Cardiovascular     ECG reviewed  Rhythm: regular    (+) hypertension    ROS comment: Sinus rhythm  Low voltage, extremity leads  Consider  anterior infarct  No Prior Tracing for Comparison  Electronically Signed [facility]: [facility] ([facility] 2024-10-22 15:28:34      Neuro/Psych  (+) psychiatric history PTSD and Anxiety  GI/Hepatic/Renal/Endo    (+) renal disease-, diabetes mellitus, thyroid problem     ROS Comment: ibs    Musculoskeletal (-) negative ROS    Abdominal    Substance History - negative use     OB/GYN negative ob/gyn ROS         Other      history of cancer active                  Anesthesia Plan    ASA 3     general with block and Diana     (Bg 365  Hgb 15.1    I have reviewed the patient's history with the patient and the chart, including all pertinent laboratory results and imaging. I have explained the risks of anesthesia including but not limited to dental damage, corneal abrasion, nerve injury, MI, stroke, and death.    )  intravenous induction     Anesthetic plan, risks, benefits, and alternatives have been provided, discussed and informed consent has been obtained with: patient.      CODE STATUS:

## 2024-10-25 NOTE — ANESTHESIA POSTPROCEDURE EVALUATION
Patient: Pamela Hardy    Procedure Summary       Date: 10/25/24 Room / Location: [facility] OR  / [facility] MAIN OR    Anesthesia Start: 1150 Anesthesia Stop: 1400    Procedure: BRONCHOSCOPY, RIGHT VIDEO ASSISTED THORACOSCOPY WITH RIGHT UPPER LOBE WEDGE RESECTION WITH DAVINCI ROBOT, MEDIASTINAL AND HILAR LYMPH NODE DISSECTION, INTERCOSTAL NERVE BLOCK (Right: Chest) Diagnosis:       Lung nodule      (Lung nodule [R91.1])    Surgeons: Kasandra Borrego MD Provider: Lobo Lucio MD    Anesthesia Type: general with blockDiana ASA Status: 3            Anesthesia Type: general with blockDiana    Vitals  Vitals Value Taken Time   /63 10/25/24 1545   Temp 36.9 °C (98.5 °F) 10/25/24 1357   Pulse 72 10/25/24 1550   Resp 18 10/25/24 1515   SpO2 98 % 10/25/24 1550   Vitals shown include unfiled device data.        Post Anesthesia Care and Evaluation    Pain management: adequate    Airway patency: patent  Anesthetic complications: No anesthetic complications    Cardiovascular status: acceptable  Respiratory status: acceptable  Hydration status: acceptable    Comments: /65   Pulse 78   Temp 36.9 °C (98.5 °F) (Oral)   Resp 18   SpO2 99%

## 2024-10-25 NOTE — ANESTHESIA PROCEDURE NOTES
Peripheral Block    Pre-sedation assessment completed: 10/25/2024 9:37 AM    Patient reassessed immediately prior to procedure    Patient location during procedure: holding area  Start time: 10/25/2024 9:37 AM  Stop time: 10/25/2024 9:42 AM  Reason for block: at surgeon's request and post-op pain management  Performed by  Anesthesiologist: Leonor Hendrix MD  Preanesthetic Checklist  Completed: patient identified, IV checked, site marked, risks and benefits discussed, surgical consent, monitors and equipment checked, pre-op evaluation and timeout performed  Prep:  Sterile barriers:cap, gloves, mask and sterile barriers  Prep: ChloraPrep  Patient monitoring: blood pressure monitoring, continuous pulse oximetry and EKG  Procedure    Sedation: yes  Performed under: PNB  Guidance:ultrasound guided    ULTRASOUND INTERPRETATION.  Using ultrasound guidance a 22 G gauge needle was placed in close proximity to the nerve, at which point, under ultrasound guidance anesthetic was injected in the area of the nerve and spread of the anesthesia was seen on ultrasound in close proximity thereto.  There were no abnormalities seen on ultrasound; a digital image was taken; and the patient tolerated the procedure with no complications. Images:still images obtained    Laterality:right  Block Type:erector spinae block  Injection Technique:single-shot  Needle Type:echogenic  Needle Gauge:20 G  Resistance on Injection: none    Medications Used: bupivacaine liposome (EXPAREL) 1.3 % injection - Infiltration   10 mL - 10/25/2024 9:54:00 AM  bupivacaine-EPINEPHrine PF (MARCAINE w/EPI) 0.25% -[id number] injection - Injection   10 mL - 10/25/2024 9:42:00 AM      Medications  Comment:U/S guidance used for needle placement & medication dispersement.    Photo placed in chart.      Post Assessment  Injection Assessment: negative aspiration for heme, no paresthesia on injection and incremental injection  Patient Tolerance:comfortable  throughout block  Complications:no  Performed by: Leonor Hendrix MD

## 2024-10-25 NOTE — ANESTHESIA PROCEDURE NOTES
Arterial Line    Pre-sedation assessment completed: 10/25/2024 9:44 AM    Patient reassessed immediately prior to procedure    Patient location during procedure: holding area  Start time: 10/25/2024 9:44 AM  Stop Time:10/25/2024 9:55 AM       Line placed for hemodynamic monitoring.  Performed By   Anesthesiologist: Leonor Hendrix MD   Preanesthetic Checklist  Completed: patient identified, IV checked, site marked, risks and benefits discussed, surgical consent, monitors and equipment checked, pre-op evaluation and timeout performed  Arterial Line Prep    Sterile Tech: gloves  Prep: ChloraPrep  Patient monitoring: blood pressure monitoring, continuous pulse oximetry and EKG  Arterial Line Procedure   Laterality:left  Location:  radial artery  Catheter size: 20 G   Guidance: ultrasound guided  PROCEDURE NOTE/ULTRASOUND INTERPRETATION.  Using ultrasound guidance the potential vascular sites for insertion of the catheter were visualized to determine the patency of the vessel to be used for vascular access.  After selecting the appropriate site for insertion, the needle was visualized under ultrasound being inserted into the radial artery, followed by ultrasound confirmation of wire and catheter placement. There were no abnormalities seen on ultrasound; an image was taken; and the patient tolerated the procedure with no complications.   Number of attempts: 1  Successful placement: yes Images: still images obtained, printed/placed on the chart  Post Assessment   Dressing Type: secured with tape, wrist guard applied and occlusive dressing applied.   Complications no  Circ/Move/Sens Assessment: unchanged.   Patient Tolerance: patient tolerated the procedure well with no apparent complications

## 2024-10-25 NOTE — H&P
[facility]   PREOPERATIVE HISTORY AND PHYSICAL    Patient Name:Pamela Hardy  : 1970  MRN: [id number]  Primary Care Physician: Crista Payton APRN  Date of admission: 10/25/2024    Subjective   Subjective     Chief Complaint: preoperative evaluation    History of Present Illness  Pamela Hardy is a 53 y.o. female who presents for preoperative evaluation. She is scheduled for THORACOSCOPY WITH wedge resection possible right upper LOBECTOMY WITH DAVINCI ROBOT (Right)    Review of Systems   Constitutional: Negative.    HENT: Negative.     Eyes: Negative.    Respiratory: Negative.     Cardiovascular: Negative.    Gastrointestinal: Negative.    Endocrine: Negative.    Genitourinary: Negative.    Musculoskeletal: Negative.    Skin: Negative.    Allergic/Immunologic: Negative.    Neurological: Negative.    Hematological: Negative.    Psychiatric/Behavioral: Negative.          Personal History     Past Medical History:   Diagnosis Date    Anxiety     Asthma     Diabetes mellitus     History of hydrocephalus as a child     History of MI (myocardial infarction)     AGE 30S  - FOLLOW ONLY PCP    [place] (hard of hearing)     Hypertension     IBS (irritable bowel syndrome)     Kidney atrophy     Non-small cell cancer of right lung     PTSD (post-traumatic stress disorder)     REQUESTS ALL FEMALE STAFF    Shingles     NO CURRENT OPEN AREAS    Shortness of breath     Smoker     Tachycardia     Thyroid goiter        Past Surgical History:   Procedure Laterality Date    BRONCHOSCOPY WITH ION ROBOTIC ASSIST N/A 2024    Procedure: BRONCHOSCOPY NAVIGATION WITH ENDOBRONCHIAL ULTRASOUND AND ION ROBOT, REBUS, EBUS, BAL, WASHINGS, NEEDLE ASPIRATES, BIOPSIES;  Surgeon: Ganga Syed MD;  Location: [facility];  Service: Robotics - Pulmonary;  Laterality: N/A;    CARDIAC CATHETERIZATION      PER DR. COLE NOTE APPROX     CHOLECYSTECTOMY      ECTOPIC PREGNANCY SURGERY Left     LIPOMA EXCISION      POSTERIOR NECK     TONSILLECTOMY         Family History: Her family history includes Cancer in her father.     Social History: She  reports that she has been smoking cigarettes. She started smoking about 42 years ago. She has a 10.7 pack-year smoking history. She has never been exposed to tobacco smoke. She has quit using smokeless tobacco. She reports that she does not drink alcohol and does not use drugs.    Home Medications:  Albuterol Sulfate (sensor), Semaglutide(0.25 or 0.5MG/DOS), albuterol sulfate HFA, aspirin, esomeprazole, glucose blood, linaclotide, nadolol, and vitamin D    Allergies:  She is allergic to hydrocodone-acetaminophen, latex, other, cilostazol, ibuprofen, iodinated contrast media, sulfamethoxazole-trimethoprim, codeine, gabapentin, and penicillins.    Objective    Objective     Vitals:    Temp:  [98.6 °F (37 °C)] 98.6 °F (37 °C)  Heart Rate:  [85-97] 88  Resp:  [18-20] 18  BP: (133)/(79) 133/79  Arterial Line BP: (100-130)/(50-63) 115/62    Physical Exam  Vitals and nursing note reviewed.   Constitutional:       Appearance: She is well-developed.   HENT:      Head: Normocephalic and atraumatic.      Nose: Nose normal.   Eyes:      Conjunctiva/sclera: Conjunctivae normal.   Cardiovascular:      Rate and Rhythm: Normal rate.   Pulmonary:      Effort: Pulmonary effort is normal.   Abdominal:      Palpations: Abdomen is soft.   Musculoskeletal:      Cervical back: Neck supple.   Skin:     General: Skin is warm and dry.   Neurological:      Mental Status: She is alert and oriented to person, place, and time.   Psychiatric:         Behavior: Behavior normal.         Thought Content: Thought content normal.         Judgment: Judgment normal.         Assessment & Plan   Assessment / Plan     Brief Patient Summary:  Pamela Hardy is a 53 y.o. female who presents for preoperative evaluation for a stage I nsclc.    Pre-Op Diagnosis Codes:      * Lung nodule [R91.1]    Active Hospital Problems:  Active Hospital Problems     Diagnosis     **Lung nodule      Plan:   Procedure(s):  THORACOSCOPY WITH wedge resection possible right upper LOBECTOMY WITH DAVINCI ROBOT    The risks, benefits, and alternatives of the procedure including but not limited to  pneumonia, prolonged air leak, atrial fibrillation   and risks of the anesthesia were discussed in detail with the patient and questions were answered. No guarantees were made or implied. Informed consent was obtained.    Kasandra Borrego MD

## 2024-10-26 ENCOUNTER — APPOINTMENT (OUTPATIENT)
Dept: GENERAL RADIOLOGY | Facility: HOSPITAL | Age: 54
DRG: 165 | End: 2024-10-26
Payer: COMMERCIAL

## 2024-10-26 LAB
ANION GAP SERPL CALCULATED.3IONS-SCNC: 6.1 MMOL/L (ref 5–15)
BASE EXCESS BLDA CALC-SCNC: -7 MMOL/L (ref -5–5)
BASOPHILS # BLD AUTO: 0.04 10*3/MM3 (ref 0–0.2)
BASOPHILS NFR BLD AUTO: 0.3 % (ref 0–1.5)
BUN SERPL-MCNC: 12 MG/DL (ref 6–20)
BUN/CREAT SERPL: 24 (ref 7–25)
CALCIUM SPEC-SCNC: 9 MG/DL (ref 8.6–10.5)
CHLORIDE SERPL-SCNC: 104 MMOL/L (ref 98–107)
CO2 BLDA-SCNC: 21 MMOL/L (ref 24–29)
CO2 SERPL-SCNC: 25.9 MMOL/L (ref 22–29)
CREAT SERPL-MCNC: 0.5 MG/DL (ref 0.57–1)
DEPRECATED RDW RBC AUTO: 42.6 FL (ref 37–54)
EGFRCR SERPLBLD CKD-EPI 2021: 111.6 ML/MIN/1.73
EOSINOPHIL # BLD AUTO: 0.02 10*3/MM3 (ref 0–0.4)
EOSINOPHIL NFR BLD AUTO: 0.1 % (ref 0.3–6.2)
ERYTHROCYTE [DISTWIDTH] IN BLOOD BY AUTOMATED COUNT: 12.6 % (ref 12.3–15.4)
GLUCOSE BLDC GLUCOMTR-MCNC: 162 MG/DL (ref 70–130)
GLUCOSE BLDC GLUCOMTR-MCNC: 209 MG/DL (ref 70–130)
GLUCOSE BLDC GLUCOMTR-MCNC: 231 MG/DL (ref 70–130)
GLUCOSE BLDC GLUCOMTR-MCNC: 242 MG/DL (ref 70–130)
GLUCOSE BLDC GLUCOMTR-MCNC: 265 MG/DL (ref 70–130)
GLUCOSE SERPL-MCNC: 223 MG/DL (ref 65–99)
HCO3 BLDA-SCNC: 19.7 MMOL/L (ref 22–26)
HCT VFR BLD AUTO: 43.2 % (ref 34–46.6)
HCT VFR BLDA CALC: 35 % (ref 38–51)
HGB BLD-MCNC: 14 G/DL (ref 12–15.9)
HGB BLDA-MCNC: 11.9 G/DL (ref 12–17)
IMM GRANULOCYTES # BLD AUTO: 0.07 10*3/MM3 (ref 0–0.05)
IMM GRANULOCYTES NFR BLD AUTO: 0.5 % (ref 0–0.5)
LYMPHOCYTES # BLD AUTO: 1.94 10*3/MM3 (ref 0.7–3.1)
LYMPHOCYTES NFR BLD AUTO: 14.4 % (ref 19.6–45.3)
MCH RBC QN AUTO: 29.8 PG (ref 26.6–33)
MCHC RBC AUTO-ENTMCNC: 32.4 G/DL (ref 31.5–35.7)
MCV RBC AUTO: 91.9 FL (ref 79–97)
MONOCYTES # BLD AUTO: 1.02 10*3/MM3 (ref 0.1–0.9)
MONOCYTES NFR BLD AUTO: 7.6 % (ref 5–12)
NEUTROPHILS NFR BLD AUTO: 10.41 10*3/MM3 (ref 1.7–7)
NEUTROPHILS NFR BLD AUTO: 77.1 % (ref 42.7–76)
NRBC BLD AUTO-RTO: 0 /100 WBC (ref 0–0.2)
PCO2 BLDA: 41 MM HG (ref 35–45)
PH BLDA: 7.3 PH UNITS (ref 7.35–7.6)
PLATELET # BLD AUTO: 216 10*3/MM3 (ref 140–450)
PMV BLD AUTO: 11.2 FL (ref 6–12)
PO2 BLDA: 106 MMHG (ref 80–105)
POTASSIUM BLDA-SCNC: 3.3 MMOL/L (ref 3.5–4.9)
POTASSIUM SERPL-SCNC: 4.2 MMOL/L (ref 3.5–5.2)
RBC # BLD AUTO: 4.7 10*6/MM3 (ref 3.77–5.28)
SAO2 % BLDA: 97 % (ref 95–98)
SODIUM SERPL-SCNC: 136 MMOL/L (ref 136–145)
WBC NRBC COR # BLD AUTO: 13.5 10*3/MM3 (ref 3.4–10.8)

## 2024-10-26 PROCEDURE — 94760 N-INVAS EAR/PLS OXIMETRY 1: CPT

## 2024-10-26 PROCEDURE — 94799 UNLISTED PULMONARY SVC/PX: CPT

## 2024-10-26 PROCEDURE — 99024 POSTOP FOLLOW-UP VISIT: CPT | Performed by: THORACIC SURGERY (CARDIOTHORACIC VASCULAR SURGERY)

## 2024-10-26 PROCEDURE — 25010000002 ONDANSETRON PER 1 MG: Performed by: THORACIC SURGERY (CARDIOTHORACIC VASCULAR SURGERY)

## 2024-10-26 PROCEDURE — 25810000003 SODIUM CHLORIDE 0.9 % SOLUTION: Performed by: THORACIC SURGERY (CARDIOTHORACIC VASCULAR SURGERY)

## 2024-10-26 PROCEDURE — 82948 REAGENT STRIP/BLOOD GLUCOSE: CPT

## 2024-10-26 PROCEDURE — 80048 BASIC METABOLIC PNL TOTAL CA: CPT | Performed by: THORACIC SURGERY (CARDIOTHORACIC VASCULAR SURGERY)

## 2024-10-26 PROCEDURE — 63710000001 INSULIN LISPRO (HUMAN) PER 5 UNITS: Performed by: THORACIC SURGERY (CARDIOTHORACIC VASCULAR SURGERY)

## 2024-10-26 PROCEDURE — 25010000002 HEPARIN (PORCINE) PER 1000 UNITS: Performed by: THORACIC SURGERY (CARDIOTHORACIC VASCULAR SURGERY)

## 2024-10-26 PROCEDURE — 85025 COMPLETE CBC W/AUTO DIFF WBC: CPT | Performed by: THORACIC SURGERY (CARDIOTHORACIC VASCULAR SURGERY)

## 2024-10-26 PROCEDURE — 94664 DEMO&/EVAL PT USE INHALER: CPT

## 2024-10-26 PROCEDURE — 71045 X-RAY EXAM CHEST 1 VIEW: CPT

## 2024-10-26 PROCEDURE — 25010000002 HYDROMORPHONE PER 4 MG: Performed by: THORACIC SURGERY (CARDIOTHORACIC VASCULAR SURGERY)

## 2024-10-26 RX ORDER — DEXTROSE MONOHYDRATE 25 G/50ML
25 INJECTION, SOLUTION INTRAVENOUS
Status: DISCONTINUED | OUTPATIENT
Start: 2024-10-26 | End: 2024-10-27 | Stop reason: HOSPADM

## 2024-10-26 RX ORDER — IBUPROFEN 600 MG/1
1 TABLET ORAL
Status: DISCONTINUED | OUTPATIENT
Start: 2024-10-26 | End: 2024-10-27 | Stop reason: HOSPADM

## 2024-10-26 RX ORDER — NICOTINE POLACRILEX 4 MG
15 LOZENGE BUCCAL
Status: DISCONTINUED | OUTPATIENT
Start: 2024-10-26 | End: 2024-10-27 | Stop reason: HOSPADM

## 2024-10-26 RX ORDER — INSULIN LISPRO 100 [IU]/ML
2-7 INJECTION, SOLUTION INTRAVENOUS; SUBCUTANEOUS
Status: DISCONTINUED | OUTPATIENT
Start: 2024-10-26 | End: 2024-10-27 | Stop reason: HOSPADM

## 2024-10-26 RX ADMIN — INSULIN LISPRO 3 UNITS: 100 INJECTION, SOLUTION INTRAVENOUS; SUBCUTANEOUS at 21:19

## 2024-10-26 RX ADMIN — HYDROMORPHONE HYDROCHLORIDE 0.5 MG: 1 INJECTION, SOLUTION INTRAMUSCULAR; INTRAVENOUS; SUBCUTANEOUS at 05:58

## 2024-10-26 RX ADMIN — INSULIN LISPRO 2 UNITS: 100 INJECTION, SOLUTION INTRAVENOUS; SUBCUTANEOUS at 16:58

## 2024-10-26 RX ADMIN — HEPARIN SODIUM 5000 UNITS: 5000 INJECTION INTRAVENOUS; SUBCUTANEOUS at 05:45

## 2024-10-26 RX ADMIN — IPRATROPIUM BROMIDE AND ALBUTEROL SULFATE 3 ML: .5; 3 SOLUTION RESPIRATORY (INHALATION) at 07:55

## 2024-10-26 RX ADMIN — HYDROMORPHONE HYDROCHLORIDE 0.5 MG: 1 INJECTION, SOLUTION INTRAMUSCULAR; INTRAVENOUS; SUBCUTANEOUS at 12:44

## 2024-10-26 RX ADMIN — SODIUM CHLORIDE 1000 ML: 9 INJECTION, SOLUTION INTRAVENOUS at 08:23

## 2024-10-26 RX ADMIN — HEPARIN SODIUM 5000 UNITS: 5000 INJECTION INTRAVENOUS; SUBCUTANEOUS at 21:19

## 2024-10-26 RX ADMIN — HYDROMORPHONE HYDROCHLORIDE 0.5 MG: 1 INJECTION, SOLUTION INTRAMUSCULAR; INTRAVENOUS; SUBCUTANEOUS at 03:17

## 2024-10-26 RX ADMIN — ACETAMINOPHEN 1000 MG: 500 TABLET, FILM COATED ORAL at 21:19

## 2024-10-26 RX ADMIN — HYDROMORPHONE HYDROCHLORIDE 0.5 MG: 1 INJECTION, SOLUTION INTRAMUSCULAR; INTRAVENOUS; SUBCUTANEOUS at 00:46

## 2024-10-26 RX ADMIN — LUBIPROSTONE 8 MCG: 8 CAPSULE, GELATIN COATED ORAL at 21:19

## 2024-10-26 RX ADMIN — HEPARIN SODIUM 5000 UNITS: 5000 INJECTION INTRAVENOUS; SUBCUTANEOUS at 14:48

## 2024-10-26 RX ADMIN — ONDANSETRON 4 MG: 2 INJECTION INTRAMUSCULAR; INTRAVENOUS at 03:21

## 2024-10-26 RX ADMIN — IPRATROPIUM BROMIDE AND ALBUTEROL SULFATE 3 ML: .5; 3 SOLUTION RESPIRATORY (INHALATION) at 15:15

## 2024-10-26 RX ADMIN — LUBIPROSTONE 8 MCG: 8 CAPSULE, GELATIN COATED ORAL at 08:22

## 2024-10-26 RX ADMIN — ACETAMINOPHEN 1000 MG: 500 TABLET, FILM COATED ORAL at 14:48

## 2024-10-26 RX ADMIN — HYDROMORPHONE HYDROCHLORIDE 0.5 MG: 1 INJECTION, SOLUTION INTRAMUSCULAR; INTRAVENOUS; SUBCUTANEOUS at 08:26

## 2024-10-26 RX ADMIN — INSULIN LISPRO 4 UNITS: 100 INJECTION, SOLUTION INTRAVENOUS; SUBCUTANEOUS at 12:55

## 2024-10-26 RX ADMIN — METOPROLOL TARTRATE 25 MG: 25 TABLET, FILM COATED ORAL at 21:19

## 2024-10-26 RX ADMIN — IPRATROPIUM BROMIDE AND ALBUTEROL SULFATE 3 ML: .5; 3 SOLUTION RESPIRATORY (INHALATION) at 23:33

## 2024-10-26 RX ADMIN — PANTOPRAZOLE SODIUM 40 MG: 40 TABLET, DELAYED RELEASE ORAL at 05:45

## 2024-10-26 RX ADMIN — ACETAMINOPHEN 1000 MG: 500 TABLET, FILM COATED ORAL at 05:45

## 2024-10-26 RX ADMIN — INSULIN LISPRO 3 UNITS: 100 INJECTION, SOLUTION INTRAVENOUS; SUBCUTANEOUS at 08:22

## 2024-10-26 NOTE — PLAN OF CARE
Problem: Adult Inpatient Plan of Care  Goal: Plan of Care Review  10/25/2024 2025 by Char Narayan RN  Outcome: Progressing  Flowsheets (Taken 10/25/2024 1830)  Outcome Evaluation: chest tube suction no airleak noted, minimal outpt, due to void,  Plan of Care Reviewed With: patient  10/25/2024 2024 by Char Narayan RN  Outcome: Progressing  Flowsheets (Taken 10/25/2024 1830)  Outcome Evaluation: chest tube suction no airleak noted, minimal outpt, due to void,  Plan of Care Reviewed With: patient  Goal: Patient-Specific Goal (Individualized)  Outcome: Progressing  Goal: Absence of Hospital-Acquired Illness or Injury  Outcome: Progressing  Intervention: Identify and Manage Fall Risk  Recent Flowsheet Documentation  Taken 10/25/2024 1846 by Char Narayan RN  Safety Promotion/Fall Prevention:   safety round/check completed   room organization consistent   lighting adjusted   fall prevention program maintained   clutter free environment maintained   assistive device/personal items within reach  Intervention: Prevent and Manage VTE (Venous Thromboembolism) Risk  Recent Flowsheet Documentation  Taken 10/25/2024 1846 by Char Narayan RN  VTE Prevention/Management:   bilateral   SCDs (sequential compression devices) off   patient refused intervention  Goal: Optimal Comfort and Wellbeing  Outcome: Progressing  Intervention: Provide Person-Centered Care  Recent Flowsheet Documentation  Taken 10/25/2024 1846 by Char Narayan RN  Trust Relationship/Rapport:   care explained   questions answered   reassurance provided   thoughts/feelings acknowledged  Goal: Readiness for Transition of Care  Outcome: Progressing   Goal Outcome Evaluation:  Plan of Care Reviewed With: patient           Outcome Evaluation: chest tube suction no airleak noted, minimal outpt, due to void,

## 2024-10-26 NOTE — PROGRESS NOTES
"  POST-OPERATIVE NOTE     Chief Complaint: Non-small cell lung cancer right upper lobe  S/P: Robotic assisted right upper lobe wedge resection with lymph node dissection  POD # 1    Subjective  Doing well, some pain on the right.  In bed.  Objective:  General Appearance:  Comfortable, well-appearing and in no acute distress.    Vital signs: (most recent): Blood pressure 117/64, pulse 86, temperature 98.1 °F (36.7 °C), temperature source Oral, resp. rate 16, height 152.4 cm (60\"), weight 65.8 kg (145 lb), SpO2 94%.  Vital signs are normal.    Lungs:  Normal effort.  There are decreased breath sounds.    Neurological: Patient is alert and oriented to person, place and time.    Skin:  Warm and dry.                Chest tube:   Site: Right, Clean, Dry, and Intact  Suction: waterseal  Air Leak: negative  24 Hour Total: 70    Results Review:     I reviewed the patient's new clinical results.  I reviewed the patient's new imaging results and agree with the interpretation.  I reviewed the patient's other test results and agree with the interpretation    Assessment & Plan   Ms. Hardy is a pleasant 53-year-old lady status post right upper lobe wedge resection for a non-small cell lung cancer.  She is doing reasonably well.  Her chest x-ray looked good this morning.  Her chest tube was removed at bedside without difficulty.  A chest x-ray will be performed in the morning and she will likely be discharged tomorrow.    I encourage pulmonary hygiene with ambulation and incentive spirometry.    Kasandra Borrego MD  [facility]  10/26/24  15:38 EDT    Patient was seen and assessed while wearing personal protective equipment including facemask, protective eyewear and gloves.  Hand hygiene performed prior to entering the room and upon exiting with doffing of gloves.        "

## 2024-10-26 NOTE — PLAN OF CARE
Problem: Adult Inpatient Plan of Care  Goal: Plan of Care Review  Outcome: Progressing  Flowsheets (Taken 10/26/2024 8832)  Progress: no change  Outcome Evaluation: vss, bp improved after ivf bolus. chest tube removed by thoracic this shift. incisions cdi. prn medicaiton provided for c/o pain. ambulation and pulmonary hygiene encourgaed. plan for possible discharge tomorrow  Plan of Care Reviewed With: patient   Goal Outcome Evaluation:  Plan of Care Reviewed With: patient        Progress: no change  Outcome Evaluation: vss, bp improved after ivf bolus. chest tube removed by thoracic this shift. incisions cdi. prn medicaiton provided for c/o pain. ambulation and pulmonary hygiene encourgaed. plan for possible discharge tomorrow

## 2024-10-26 NOTE — PLAN OF CARE
Goal Outcome Evaluation:   VSS. A&OX4. Pain managed with PRN IV pain medication. Pt c/o nausea with pain medication, PRN zofran given. 02 @2LPM. Up to [facility] with assist x2 d/t lines/drains. Voiding without difficulty. A line in place, positional, waveform dampened at times, pt declining armboard. Right chest tube to water seal @ 0300 per MD order. No air leak noted. Pt does not want right side touched so unable to assess for subq air. Pt and family states she has internal shingles on the right side and it is too painful to be touched. Educated r/t pulmonary hygiene, pt having difficulty with coughing/deep breathing. Pt requesting x4 side rails up, both pt and family have been educated by staff that we can not do this. Pt continues to request all 4 rails up, verbalizes understanding. IVF d/c'd d/t elevated blood sugar, treated per glucommander. Call light in reach, bed alarm on.     0600- A line dampened, no blood return. A line pulled and labs reentered for lab collect.

## 2024-10-27 ENCOUNTER — APPOINTMENT (OUTPATIENT)
Dept: GENERAL RADIOLOGY | Facility: HOSPITAL | Age: 54
DRG: 165 | End: 2024-10-27
Payer: COMMERCIAL

## 2024-10-27 VITALS
BODY MASS INDEX: 28.47 KG/M2 | TEMPERATURE: 98 F | OXYGEN SATURATION: 99 % | HEIGHT: 60 IN | DIASTOLIC BLOOD PRESSURE: 72 MMHG | WEIGHT: 145 LBS | HEART RATE: 76 BPM | RESPIRATION RATE: 16 BRPM | SYSTOLIC BLOOD PRESSURE: 120 MMHG

## 2024-10-27 LAB
GLUCOSE BLDC GLUCOMTR-MCNC: 222 MG/DL (ref 70–130)
GLUCOSE BLDC GLUCOMTR-MCNC: 238 MG/DL (ref 70–130)

## 2024-10-27 PROCEDURE — 94799 UNLISTED PULMONARY SVC/PX: CPT

## 2024-10-27 PROCEDURE — 94664 DEMO&/EVAL PT USE INHALER: CPT

## 2024-10-27 PROCEDURE — 94618 PULMONARY STRESS TESTING: CPT

## 2024-10-27 PROCEDURE — 71045 X-RAY EXAM CHEST 1 VIEW: CPT

## 2024-10-27 PROCEDURE — 25010000002 HEPARIN (PORCINE) PER 1000 UNITS: Performed by: THORACIC SURGERY (CARDIOTHORACIC VASCULAR SURGERY)

## 2024-10-27 PROCEDURE — 63710000001 INSULIN LISPRO (HUMAN) PER 5 UNITS: Performed by: THORACIC SURGERY (CARDIOTHORACIC VASCULAR SURGERY)

## 2024-10-27 PROCEDURE — 82948 REAGENT STRIP/BLOOD GLUCOSE: CPT

## 2024-10-27 RX ORDER — ACETAMINOPHEN 500 MG
1000 TABLET ORAL EVERY 8 HOURS
Qty: 90 TABLET | Refills: 0 | Status: SHIPPED | OUTPATIENT
Start: 2024-10-27

## 2024-10-27 RX ORDER — OXYCODONE HYDROCHLORIDE 5 MG/1
5 TABLET ORAL EVERY 4 HOURS PRN
Qty: 30 TABLET | Refills: 0 | Status: SHIPPED | OUTPATIENT
Start: 2024-10-27 | End: 2024-11-03

## 2024-10-27 RX ADMIN — ACETAMINOPHEN 1000 MG: 500 TABLET, FILM COATED ORAL at 05:35

## 2024-10-27 RX ADMIN — LUBIPROSTONE 8 MCG: 8 CAPSULE, GELATIN COATED ORAL at 08:26

## 2024-10-27 RX ADMIN — METOPROLOL TARTRATE 25 MG: 25 TABLET, FILM COATED ORAL at 08:26

## 2024-10-27 RX ADMIN — INSULIN LISPRO 3 UNITS: 100 INJECTION, SOLUTION INTRAVENOUS; SUBCUTANEOUS at 08:26

## 2024-10-27 RX ADMIN — IPRATROPIUM BROMIDE AND ALBUTEROL SULFATE 3 ML: .5; 3 SOLUTION RESPIRATORY (INHALATION) at 07:11

## 2024-10-27 RX ADMIN — PANTOPRAZOLE SODIUM 40 MG: 40 TABLET, DELAYED RELEASE ORAL at 05:35

## 2024-10-27 RX ADMIN — INSULIN LISPRO 2 UNITS: 100 INJECTION, SOLUTION INTRAVENOUS; SUBCUTANEOUS at 12:06

## 2024-10-27 RX ADMIN — HEPARIN SODIUM 5000 UNITS: 5000 INJECTION INTRAVENOUS; SUBCUTANEOUS at 05:35

## 2024-10-27 NOTE — DISCHARGE SUMMARY
Patient Care Team:  Crista Payton APRN as PCP - General (Nurse Practitioner)  Ganga Syed MD as Consulting Physician (Pulmonary Disease)    Date of Admission: 10/25/2024   Date of Discharge:  10/27/2024    Discharge Diagnosis: Non-small cell lung cancer    Presenting Problem  Non-small cell lung cancer right upper lobe    History of Present Illness  Pamela Hardy is a 53 y.o. female who presented with a newly diagnosed non-small cell lung cancer.    Hospital Course  The patient was taken to the operating room on 11/25/2024 for a wedge resection and lymph node dissection.  She did well postoperatively.  Her chest tube was removed on postoperative day #1 and she was discharged home on postoperative day #2.    Procedures Performed  Procedure(s):  BRONCHOSCOPY, RIGHT VIDEO ASSISTED THORACOSCOPY WITH RIGHT UPPER LOBE WEDGE RESECTION WITH DAVINCI ROBOT, MEDIASTINAL AND HILAR LYMPH NODE DISSECTION, INTERCOSTAL NERVE BLOCK       Consults:   Consults       No orders found from 9/26/2024 to 10/26/2024.            Pertinent Test Results:     Imaging Results (Last 24 Hours)       Procedure Component Value Units Date/Time    XR Chest 1 View [[id number]] Collected: 10/27/24 0605     Updated: 10/27/24 0609    Narrative:      ONE-VIEW PORTABLE CHEST AT 5:16 A.M.     HISTORY: Recent right chest surgery. Chest tube removal.     FINDINGS: The right-sided chest tube has been removed since yesterday  and there is no evidence of pneumothorax. There is considerable  worsening of atelectasis scattered in the right lung as well as in the  lower left lung and continued follow-up evaluation is recommended. I  cannot completely exclude a component of pneumonia. The heart size is  normal.     This report was finalized on 10/27/2024 6:06 AM by Dr. Gonzalo Eason M.D on Workstation: [id number]               Lab Results (last 24 hours)       Procedure Component Value Units Date/Time    POC Glucose Once [[id number]]  (Abnormal) Collected:  10/27/24 1138    Specimen: Blood Updated: 10/27/24 1140     Glucose 222 mg/dL     POC Glucose Once [[id number]]  (Abnormal) Collected: 10/27/24 0537    Specimen: Blood Updated: 10/27/24 0539     Glucose 238 mg/dL     POC Glucose Once [[id number]]  (Abnormal) Collected: 10/26/24 2035    Specimen: Blood Updated: 10/26/24 2036     Glucose 231 mg/dL     POC Glucose Once [[id number]]  (Abnormal) Collected: 10/26/24 1619    Specimen: Blood Updated: 10/26/24 1620     Glucose 162 mg/dL               Condition on Discharge: Stable    Vital Signs  Temp:  [98 °F (36.7 °C)-98.4 °F (36.9 °C)] 98 °F (36.7 °C)  Heart Rate:  [85-92] 91  Resp:  [16-18] 16  BP: (109-132)/(61-69) 132/69    Physical Exam:    General Appearance:  Alert, cooperative, in no acute distress   Head:  Normocephalic, without obvious abnormality, atraumatic   Eyes:  Lids and lashes normal, conjunctivae and sclerae normal, no icterus, no pallor, corneas clear, PERRLA   Ears:  Ears appear intact with no abnormalities noted   Throat:  No oral lesions, no thrush, oral mucosa moist   Neck:  No adenopathy, supple, trachea midline, no thyromegaly, no carotid bruit, no JVD   Back:  No kyphosis present, no scoliosis present, no skin lesions, erythema or scars, no tenderness to percussion, or palpation, range of motion normal   Lungs:  Clear to auscultation,respirations regular, even and unlabored    Heart:  Regular rhythm and normal rate, normal S1 and S2, no murmur, no gallop, no rub, no click   Breast Exam:  Deferred   Abdomen:  Normal bowel sounds, no masses, no organomegaly, soft non-tender, non-distended, no guarding, no rebound tenderness   Genitalia:  Deferred   Extremities:  Moves all extremities well, no edema, no cyanosis, no redness   Pulses:  Pulses palpable and equal bilaterally   Skin:  No bleeding, bruising or rash   Lymph nodes:  No palpable adenopathy   Neurologic:  Cranial nerves 2 - 12 grossly intact, sensation intact, DTR present and equal bilaterally        Discharge Disposition  Home today    Discharge Medications     Discharge Medications        New Medications        Instructions Start Date   acetaminophen 500 MG tablet  Commonly known as: TYLENOL   1,000 mg, Oral, Every 8 Hours      oxyCODONE 5 MG immediate release tablet  Commonly known as: ROXICODONE   5 mg, Oral, Every 4 Hours PRN             Continue These Medications        Instructions Start Date   aspirin 81 MG EC tablet   81 mg, Daily      esomeprazole 40 MG capsule  Commonly known as: nexIUM   Take 1 capsule by mouth Every Morning Before Breakfast.      FREESTYLE LITE test strip  Generic drug: glucose blood   1 each, Other, As Needed      glucose blood test strip   1 each, Other, As Needed      Linzess 72 MCG capsule capsule  Generic drug: linaclotide   72 mcg, Daily PRN      nadolol 20 MG tablet  Commonly known as: CORGARD   20 mg, Daily      Ozempic (0.25 or 0.5 MG/DOSE) 2 MG/3ML solution pen-injector  Generic drug: Semaglutide(0.25 or 0.5MG/DOS)   0.5 mg, Weekly      ProAir Digihaler 108 (90 Base) MCG/ACT aerosol powder   Generic drug: Albuterol Sulfate (sensor)   2 puffs, Daily PRN      Ventolin  (90 Base) MCG/ACT inhaler  Generic drug: albuterol sulfate HFA   2 puffs, Every 4 Hours PRN      vitamin D 1.25 MG (77657 UT) capsule capsule  Commonly known as: ERGOCALCIFEROL   50,000 Units, Weekly               Discharge Instructions:  No heavy lifting, pushing, pulling greater than 10 pounds.  No driving up until 2 weeks after surgery and no longer taking narcotics.  Resume home diet as tolerated.  Continue incentive spirometer at least 4 times per day.  Remove dressing from post chest tube site after 48 hours, may shower and clean surgical sites with antibacterial soap or hydrogen peroxide, and apply gauze dressing or band-aid as needed for any drainage.  No dressing needed once no longer draining.          Follow-up Appointments  Future Appointments   Date Time Provider Department Center    11/5/2024  2:00 PM Kasandra Borrego MD [facility]         Test Results Pending at Discharge  Pending Labs       Order Current Status    Tissue Pathology Exam In process            For any questions regarding patient's stay, please refer to patient's chart.    Kasandra Borrego MD  [facility]  10/27/24  13:00 EDT

## 2024-10-27 NOTE — PLAN OF CARE
Goal Outcome Evaluation:   VSS. A&OX4. Calm and cooperative. C/o some pain to right flank, declined pain medication. Up with SBA to bsdc. Dressing to right chest tube site CDI. Call light in reach.

## 2024-10-27 NOTE — PROGRESS NOTES
Exercise Oximetry    Patient Name:Pamela Hadry   MRN: [id number]   Date: 10/27/24             ROOM AIR BASELINE   SpO2% 96   Heart Rate 85   Blood Pressure      EXERCISE ON ROOM AIR SpO2% EXERCISE ON O2 @ 0 LPM SpO2%   1 MINUTE 96 1 MINUTE    2 MINUTES 95 2 MINUTES    3 MINUTES 96 3 MINUTES    4 MINUTES 96 4 MINUTES    5 MINUTES 95 5 MINUTES    6 MINUTES 96 6 MINUTES               Distance Walked  240 feet Distance Walked   Dyspnea (Latoya Scale)   Dyspnea (Latoya Scale)   Fatigue (Latoya Scale)   Fatigue (Latoya Scale)   SpO2% Post Exercise   SpO2% Post Exercise   HR Post Exercise   HR Post Exercise   Time to Recovery   Time to Recovery     Comments: Patient ambulated in the hallway without problem. Patient able to maintain O2 saturations above 95 for the entire duration of the walk on room air.

## 2024-10-27 NOTE — PLAN OF CARE
Goal Outcome Evaluation:  Plan of Care Reviewed With: patient        Progress: improving  Outcome Evaluation: vss. plan for discharge this afternoon.

## 2024-10-29 NOTE — PAYOR COMM NOTE
"Pamela Hardy (53 y.o. Female)     PLEASE SEE ATTACHED FOR DC NOTICE    REF # [id number]     THANK YOU  DARIUS GARCIA RN/ DEPT  [facility]   [phone]  [phone]        Date of Birth   1970    Social Security Number       Address   [address]  [facility] [address]    Home Phone   [phone]    MRN   [id number]       Shinto   Unknown    Marital Status   Unknown                            Admission Date   10/25/24    Admission Type   Elective    Admitting Provider   Kasandra Borrego MD    Attending Provider       Department, Room/Bed   [facility], E563/1       Discharge Date   10/27/2024    Discharge Disposition   Home or Self Care    Discharge Destination                                 Attending Provider: (none)   Allergies: Hydrocodone-acetaminophen, Latex, Other, Cilostazol, Ibuprofen, Iodinated Contrast Media, Sulfamethoxazole-trimethoprim, Codeine, Gabapentin, Penicillins    Isolation: None   Infection: None   Code Status: Prior    Ht: 152.4 cm (60\")   Wt: 65.8 kg (145 lb)    Admission Cmt: None   Principal Problem: Lung nodule [R91.1]                   Active Insurance as of 10/25/2024       Primary Coverage       Payor Plan Insurance Group Employer/Plan Group    [facility] [id number]       Payor Plan Address Payor Plan Phone Number Payor Plan Fax Number Effective Dates    [address] [address]   3/1/2021 - None Entered    [facility] [phone]         Subscriber Name Subscriber Birth Date Member ID       PAMELA HARDY 1970 [id number]                     Emergency Contacts        (Rel.) Home Phone Work Phone Mobile Phone    Priscila Locke (Sister) -- -- [phone]    Franki ARELLANOEl (Brother) -- -- [phone]              [place]: NPI [id number]  Tax ID [id number]     Discharge Summary        Kasandra Borrego MD at 10/27/24 8449               Patient Care Team:  Crista Payton, APRN as " PCP - General (Nurse Practitioner)  Ganga Syed MD as Consulting Physician (Pulmonary Disease)    Date of Admission: 10/25/2024   Date of Discharge:  10/27/2024    Discharge Diagnosis: Non-small cell lung cancer    Presenting Problem  Non-small cell lung cancer right upper lobe    History of Present Illness  Pamela Hardy is a 53 y.o. female who presented with a newly diagnosed non-small cell lung cancer.    Hospital Course  The patient was taken to the operating room on 11/25/2024 for a wedge resection and lymph node dissection.  She did well postoperatively.  Her chest tube was removed on postoperative day #1 and she was discharged home on postoperative day #2.    Procedures Performed  Procedure(s):  BRONCHOSCOPY, RIGHT VIDEO ASSISTED THORACOSCOPY WITH RIGHT UPPER LOBE WEDGE RESECTION WITH DAVINCI ROBOT, MEDIASTINAL AND HILAR LYMPH NODE DISSECTION, INTERCOSTAL NERVE BLOCK       Consults:   Consults       No orders found from 9/26/2024 to 10/26/2024.            Pertinent Test Results:     Imaging Results (Last 24 Hours)       Procedure Component Value Units Date/Time    XR Chest 1 View [[id number]] Collected: 10/27/24 0605     Updated: 10/27/24 0609    Narrative:      ONE-VIEW PORTABLE CHEST AT 5:16 A.M.     HISTORY: Recent right chest surgery. Chest tube removal.     FINDINGS: The right-sided chest tube has been removed since yesterday  and there is no evidence of pneumothorax. There is considerable  worsening of atelectasis scattered in the right lung as well as in the  lower left lung and continued follow-up evaluation is recommended. I  cannot completely exclude a component of pneumonia. The heart size is  normal.     This report was finalized on 10/27/2024 6:06 AM by Dr. Gonzalo Eason M.D on Workstation: [id number]               Lab Results (last 24 hours)       Procedure Component Value Units Date/Time    POC Glucose Once [[id number]]  (Abnormal) Collected: 10/27/24 1138    Specimen: Blood Updated: 10/27/24  1140     Glucose 222 mg/dL     POC Glucose Once [[id number]]  (Abnormal) Collected: 10/27/24 0537    Specimen: Blood Updated: 10/27/24 0539     Glucose 238 mg/dL     POC Glucose Once [[id number]]  (Abnormal) Collected: 10/26/24 2035    Specimen: Blood Updated: 10/26/24 2036     Glucose 231 mg/dL     POC Glucose Once [[id number]]  (Abnormal) Collected: 10/26/24 1619    Specimen: Blood Updated: 10/26/24 1620     Glucose 162 mg/dL               Condition on Discharge: Stable    Vital Signs  Temp:  [98 °F (36.7 °C)-98.4 °F (36.9 °C)] 98 °F (36.7 °C)  Heart Rate:  [85-92] 91  Resp:  [16-18] 16  BP: (109-132)/(61-69) 132/69    Physical Exam:    General Appearance:  Alert, cooperative, in no acute distress   Head:  Normocephalic, without obvious abnormality, atraumatic   Eyes:  Lids and lashes normal, conjunctivae and sclerae normal, no icterus, no pallor, corneas clear, PERRLA   Ears:  Ears appear intact with no abnormalities noted   Throat:  No oral lesions, no thrush, oral mucosa moist   Neck:  No adenopathy, supple, trachea midline, no thyromegaly, no carotid bruit, no JVD   Back:  No kyphosis present, no scoliosis present, no skin lesions, erythema or scars, no tenderness to percussion, or palpation, range of motion normal   Lungs:  Clear to auscultation,respirations regular, even and unlabored    Heart:  Regular rhythm and normal rate, normal S1 and S2, no murmur, no gallop, no rub, no click   Breast Exam:  Deferred   Abdomen:  Normal bowel sounds, no masses, no organomegaly, soft non-tender, non-distended, no guarding, no rebound tenderness   Genitalia:  Deferred   Extremities:  Moves all extremities well, no edema, no cyanosis, no redness   Pulses:  Pulses palpable and equal bilaterally   Skin:  No bleeding, bruising or rash   Lymph nodes:  No palpable adenopathy   Neurologic:  Cranial nerves 2 - 12 grossly intact, sensation intact, DTR present and equal bilaterally       Discharge Disposition  Home today    Discharge  Medications     Discharge Medications        New Medications        Instructions Start Date   acetaminophen 500 MG tablet  Commonly known as: TYLENOL   1,000 mg, Oral, Every 8 Hours      oxyCODONE 5 MG immediate release tablet  Commonly known as: ROXICODONE   5 mg, Oral, Every 4 Hours PRN             Continue These Medications        Instructions Start Date   aspirin 81 MG EC tablet   81 mg, Daily      esomeprazole 40 MG capsule  Commonly known as: nexIUM   Take 1 capsule by mouth Every Morning Before Breakfast.      FREESTYLE LITE test strip  Generic drug: glucose blood   1 each, Other, As Needed      glucose blood test strip   1 each, Other, As Needed      Linzess 72 MCG capsule capsule  Generic drug: linaclotide   72 mcg, Daily PRN      nadolol 20 MG tablet  Commonly known as: CORGARD   20 mg, Daily      Ozempic (0.25 or 0.5 MG/DOSE) 2 MG/3ML solution pen-injector  Generic drug: Semaglutide(0.25 or 0.5MG/DOS)   0.5 mg, Weekly      ProAir Digihaler 108 (90 Base) MCG/ACT aerosol powder   Generic drug: Albuterol Sulfate (sensor)   2 puffs, Daily PRN      Ventolin  (90 Base) MCG/ACT inhaler  Generic drug: albuterol sulfate HFA   2 puffs, Every 4 Hours PRN      vitamin D 1.25 MG (02503 UT) capsule capsule  Commonly known as: ERGOCALCIFEROL   50,000 Units, Weekly               Discharge Instructions:  No heavy lifting, pushing, pulling greater than 10 pounds.  No driving up until 2 weeks after surgery and no longer taking narcotics.  Resume home diet as tolerated.  Continue incentive spirometer at least 4 times per day.  Remove dressing from post chest tube site after 48 hours, may shower and clean surgical sites with antibacterial soap or hydrogen peroxide, and apply gauze dressing or band-aid as needed for any drainage.  No dressing needed once no longer draining.          Follow-up Appointments  Future Appointments   Date Time Provider Department Center   11/5/2024  2:00 PM Kasandra Borrego MD [facility]          Test Results Pending at Discharge  Pending Labs       Order Current Status    Tissue Pathology Exam In process            For any questions regarding patient's stay, please refer to patient's chart.    Kasandra Borrego MD  [facility]  10/27/24  13:00 EDT             Electronically signed by Kasandra Borrego MD at 10/27/24 6442

## 2024-10-30 LAB
CYTO UR: NORMAL
LAB AP CASE REPORT: NORMAL
LAB AP DIAGNOSIS COMMENT: NORMAL
LAB AP SPECIAL STAINS: NORMAL
LAB AP SYNOPTIC CHECKLIST: NORMAL
Lab: NORMAL
PATH REPORT.FINAL DX SPEC: NORMAL
PATH REPORT.GROSS SPEC: NORMAL

## 2024-11-05 ENCOUNTER — HOSPITAL ENCOUNTER (OUTPATIENT)
Dept: GENERAL RADIOLOGY | Facility: HOSPITAL | Age: 54
Discharge: HOME | End: 2024-11-05
Payer: COMMERCIAL

## 2024-11-05 ENCOUNTER — OFFICE VISIT (OUTPATIENT)
Dept: OTHER | Facility: HOSPITAL | Age: 54
End: 2024-11-05
Payer: COMMERCIAL

## 2024-11-05 VITALS
DIASTOLIC BLOOD PRESSURE: 86 MMHG | HEART RATE: 92 BPM | BODY MASS INDEX: 28.32 KG/M2 | OXYGEN SATURATION: 97 % | WEIGHT: 145 LBS | SYSTOLIC BLOOD PRESSURE: 159 MMHG

## 2024-11-05 DIAGNOSIS — C34.91 NSCLC OF RIGHT LUNG: Primary | ICD-10-CM

## 2024-11-05 DIAGNOSIS — R91.1 LUNG NODULE: ICD-10-CM

## 2024-11-05 LAB
CYTO UR: NORMAL
LAB AP CASE REPORT: NORMAL
LAB AP DIAGNOSIS COMMENT: NORMAL
LAB AP SPECIAL STAINS: NORMAL
LAB AP SYNOPTIC CHECKLIST: NORMAL
Lab: NORMAL
PATH REPORT.ADDENDUM SPEC: NORMAL
PATH REPORT.FINAL DX SPEC: NORMAL
PATH REPORT.GROSS SPEC: NORMAL

## 2024-11-05 PROCEDURE — 71046 X-RAY EXAM CHEST 2 VIEWS: CPT

## 2024-11-05 PROCEDURE — 1160F RVW MEDS BY RX/DR IN RCRD: CPT | Performed by: THORACIC SURGERY (CARDIOTHORACIC VASCULAR SURGERY)

## 2024-11-05 PROCEDURE — 1159F MED LIST DOCD IN RCRD: CPT | Performed by: THORACIC SURGERY (CARDIOTHORACIC VASCULAR SURGERY)

## 2024-11-19 NOTE — PROGRESS NOTES
"Chief Complaint  Lung Nodule (Post op - 2 wks - RUL Wedge )    Subjective        Pamela Hardy presents to [facility]  History of Present Illness  Ms. Hardy is a pleasant 53-year-old lady who presents in follow-up after wedge resection for non-small cell lung cancer.  She is doing well.  Objective   Vital Signs:  /86 (BP Location: Left arm, Patient Position: Sitting, Cuff Size: Adult)   Pulse 92   Wt 65.8 kg (145 lb)   SpO2 97%   BMI 28.32 kg/m²   Estimated body mass index is 28.32 kg/m² as calculated from the following:    Height as of 10/25/24: 152.4 cm (60\").    Weight as of this encounter: 65.8 kg (145 lb).            Physical Exam  Vitals and nursing note reviewed.   Constitutional:       Appearance: She is well-developed.   HENT:      Head: Normocephalic and atraumatic.      Nose: Nose normal.   Eyes:      Conjunctiva/sclera: Conjunctivae normal.   Cardiovascular:      Rate and Rhythm: Normal rate.   Pulmonary:      Effort: Pulmonary effort is normal.   Abdominal:      Palpations: Abdomen is soft.   Musculoskeletal:      Cervical back: Neck supple.   Skin:     General: Skin is warm and dry.   Neurological:      Mental Status: She is alert and oriented to person, place, and time.   Psychiatric:         Behavior: Behavior normal.         Thought Content: Thought content normal.         Judgment: Judgment normal.        Result Review :          Pathology: T1a N0 adenocarcinoma with negative margins no high risk features    I have independently reviewed the chest x-ray performed today which demonstrates good expansion bilateral lungs, no pneumothorax.     Assessment and Plan   Diagnoses and all orders for this visit:    1. NSCLC of right lung (Primary)  -     CT Chest Without Contrast; Future    2. Lung nodule    Ms. Hardy is a pleasant 53-year-old lady status post resection of a T1 a N0 adenocarcinoma of the right upper lobe.  She is recovering well from her procedure.  " She may resume all normal activity.  She does not need any further treatment.  She will need to begin her lung cancer surveillance I will plan to see her in 4 months with a CT of the chest.         Follow Up   No follow-ups on file.  Patient was given instructions and counseling regarding her condition or for health maintenance advice. Please see specific information pulled into the AVS if appropriate.

## 2025-03-03 ENCOUNTER — TELEPHONE (OUTPATIENT)
Dept: SURGERY | Facility: CLINIC | Age: 55
End: 2025-03-03
Payer: COMMERCIAL

## 2025-03-03 NOTE — TELEPHONE ENCOUNTER
PT HAS NO WAY TO GET TO [facility] OR AROUND [facility] SO SHE IS NO LONGER FOLLOWING UP... I OFFERED HER TO CALL BACK WHEN SHE GOT HER CAR FIXXED AND OR A RIDE... SHE SAID SHE WOULD SEE  ONLY IF SHE CAME TO [place]... SO I TOLD HER ONCE SHE COULD FIND A PROVIDER CLOSER THAN SHE COULD GIVE US A CALL AND WE WOULD SEND HER STUFF OVER

## 2025-05-14 ENCOUNTER — HOSPITAL ENCOUNTER (OUTPATIENT)
Dept: OTHER | Facility: HOSPITAL | Age: 55
Discharge: HOME | End: 2025-05-14

## 2025-08-11 ENCOUNTER — TELEPHONE (OUTPATIENT)
Dept: NEUROSURGERY | Facility: CLINIC | Age: 55
End: 2025-08-11
Payer: COMMERCIAL

## 2025-08-13 ENCOUNTER — OFFICE VISIT (OUTPATIENT)
Dept: NEUROSURGERY | Facility: CLINIC | Age: 55
End: 2025-08-13
Payer: COMMERCIAL

## 2025-08-13 VITALS
DIASTOLIC BLOOD PRESSURE: 73 MMHG | WEIGHT: 135.3 LBS | SYSTOLIC BLOOD PRESSURE: 109 MMHG | HEIGHT: 60 IN | BODY MASS INDEX: 26.56 KG/M2

## 2025-08-13 DIAGNOSIS — M41.9 SCOLIOSIS OF THORACIC SPINE, UNSPECIFIED SCOLIOSIS TYPE: ICD-10-CM

## 2025-08-13 DIAGNOSIS — M51.34 DEGENERATIVE DISC DISEASE, THORACIC: ICD-10-CM

## 2025-08-13 DIAGNOSIS — M51.360 DEGENERATION OF INTERVERTEBRAL DISC OF LUMBAR REGION WITH DISCOGENIC BACK PAIN: ICD-10-CM

## 2025-08-13 DIAGNOSIS — M54.14 THORACIC RADICULOPATHY: Primary | ICD-10-CM

## 2025-08-13 RX ORDER — GABAPENTIN 400 MG/1
400 CAPSULE ORAL 2 TIMES DAILY
Qty: 60 CAPSULE | Refills: 1 | Status: SHIPPED | OUTPATIENT
Start: 2025-08-13

## 2025-08-13 RX ORDER — LIDOCAINE 50 MG/G
1 PATCH TOPICAL EVERY 24 HOURS
Qty: 30 PATCH | Refills: 1 | Status: SHIPPED | OUTPATIENT
Start: 2025-08-13

## 2025-08-13 RX ORDER — LANOLIN ALCOHOL/MO/W.PET/CERES
1000 CREAM (GRAM) TOPICAL DAILY
COMMUNITY

## (undated) DEVICE — SINGLE USE ASPIRATION NEEDLE: Brand: SINGLE USE ASPIRATION NEEDLE

## (undated) DEVICE — SUT MNCRYL PLS ANTIB UD 4/0 PS2 18IN

## (undated) DEVICE — NDL INJ UROL WILLIAMS CYSTO 3.7F 23G 8MM

## (undated) DEVICE — ADHS SKIN SURG TISS VISC PREMIERPRO EXOFIN HI/VISC FAST/DRY

## (undated) DEVICE — SUT VIC 0 CT1 36IN J946H

## (undated) DEVICE — TROC BLADLES AIRSEAL/OPTI THRD 8X120MM 1P/U

## (undated) DEVICE — SOLIDIFIER LIQLOC PLS 1500CC BT

## (undated) DEVICE — KIT,ANTI FOG,W/SPONGE & FLUID,SOFT PACK: Brand: MEDLINE

## (undated) DEVICE — ST TBG AIRSEAL BIF FLTR W/ACT/CHARCOAL/FLTR

## (undated) DEVICE — REPROCESSING CONSUMABLES KIT

## (undated) DEVICE — MARKR SKIN W/RULR 2TP

## (undated) DEVICE — TOWEL,OR,DSP,ST,WHITE,DLX,4/PK,20PK/CS: Brand: MEDLINE

## (undated) DEVICE — Device: Brand: TISSUE RETRIEVAL SYSTEM

## (undated) DEVICE — SUREFORM 45 CURVED-TIP: Brand: SUREFORM

## (undated) DEVICE — FRCP BIOP CAPTURA BRONCH 1.8 110CM BLU

## (undated) DEVICE — ELECTRD BLD EZ CLN MOD XLNG 2.75IN

## (undated) DEVICE — KT LINEN QUICKSQUITE SAHARA STD DRW/LIFT 60X78IN

## (undated) DEVICE — VISION PROBE ADAPTER AND SUCTION ADAPTER

## (undated) DEVICE — CONTAINER,SPECIMEN,OR STERILE,4OZ: Brand: MEDLINE

## (undated) DEVICE — CATHETER GUIDE

## (undated) DEVICE — REPROCESSING COVER

## (undated) DEVICE — LINER SURG CANSTR SXN S/RIGD 1500CC

## (undated) DEVICE — 24 FR STRAIGHT – SOFT PVC CATHETER: Brand: PVC THORACIC CATHETERS

## (undated) DEVICE — PENCL ES MEGADINE EZ/CLEAN BUTN W/HOLSTR 10FT

## (undated) DEVICE — 3M™ STERI-DRAPE™ INSTRUMENT POUCH 1018L: Brand: STERI-DRAPE™

## (undated) DEVICE — GOWN ISOL OVR/HD TIE THMB/LP/CUF PE XLG BLU

## (undated) DEVICE — SINGLE USE SUCTION VALVE MAJ-209: Brand: SINGLE USE SUCTION VALVE (STERILE)

## (undated) DEVICE — BIOPSY NEEDLE, 21G: Brand: FLEXISION

## (undated) DEVICE — COLUMN DRAPE

## (undated) DEVICE — KT CLN CLEANOR SCPE

## (undated) DEVICE — 2, DISPOSABLE SUCTION/IRRIGATOR WITH DISPOSABLE TIP: Brand: STRYKEFLOW

## (undated) DEVICE — STPCK 1WY SPINLOCK FML LL NONDEHP LF .20ML

## (undated) DEVICE — VISION PROBE: Brand: ION

## (undated) DEVICE — THE STERILE LIGHT HANDLE COVER IS USED WITH STERIS SURGICAL LIGHTING AND VISUALIZATION SYSTEMS.

## (undated) DEVICE — APPL CHLORAPREP HI/LITE 26ML ORNG

## (undated) DEVICE — BLADELESS OBTURATOR: Brand: WECK VISTA

## (undated) DEVICE — Device

## (undated) DEVICE — TOTAL TRAY, 16FR 10ML SIL FOLEY, URN: Brand: MEDLINE

## (undated) DEVICE — CATHETER: Brand: ION

## (undated) DEVICE — TRAP,MUCUS SPECIMEN,40CC: Brand: MEDLINE

## (undated) DEVICE — SINGLE USE BIOPSY VALVE MAJ-210: Brand: SINGLE USE BIOPSY VALVE (STERILE)

## (undated) DEVICE — SUT SILK 0 PSL 18IN 580H

## (undated) DEVICE — LP VESL MAXI 2.5X1MM RED 2PK

## (undated) DEVICE — ENDOPATH XCEL BLADELESS TROCARS WITH STABILITY SLEEVES: Brand: ENDOPATH XCEL

## (undated) DEVICE — PATIENT RETURN ELECTRODE, SINGLE-USE, CONTACT QUALITY MONITORING, ADULT, WITH 9FT CORD, FOR PATIENTS WEIGING OVER 33LBS. (15KG): Brand: MEGADYNE

## (undated) DEVICE — GLV SURG BIOGEL LTX PF 6

## (undated) DEVICE — SWIVEL CONNECTOR

## (undated) DEVICE — SLV SCD KN/LEN ADJ EXPRSS BLENDED MD 1P/U

## (undated) DEVICE — SOL ANTISTICK CAUTRY ELECTROLUBE LF

## (undated) DEVICE — ANTIBACTERIAL UNDYED BRAIDED (POLYGLACTIN 910), SYNTHETIC ABSORBABLE SUTURE: Brand: COATED VICRYL

## (undated) DEVICE — SPNG LAP CIGARETTE KITTNER 5MM STRL PK/5

## (undated) DEVICE — OASIS DRAIN, SINGLE, INLINE & ATS COMPATIBLE: Brand: OASIS

## (undated) DEVICE — EXTENSION SET, MALE LUER LOCK ADAPTER WITH RETRACTABLE COLLAR

## (undated) DEVICE — REPROCESSING ACCESSORIES KIT

## (undated) DEVICE — SYR LUERLOK 20CC BX/50

## (undated) DEVICE — SUT SILK 0 TIES 30IN A306H

## (undated) DEVICE — LOU THORACIC: Brand: MEDLINE INDUSTRIES, INC.

## (undated) DEVICE — SOL NACL 0.9PCT 1000ML

## (undated) DEVICE — ARM DRAPE

## (undated) DEVICE — SEAL